# Patient Record
Sex: FEMALE | Race: WHITE | NOT HISPANIC OR LATINO | URBAN - METROPOLITAN AREA
[De-identification: names, ages, dates, MRNs, and addresses within clinical notes are randomized per-mention and may not be internally consistent; named-entity substitution may affect disease eponyms.]

---

## 2017-10-24 ENCOUNTER — EMERGENCY (EMERGENCY)
Facility: HOSPITAL | Age: 57
LOS: 1 days | Discharge: HOME | End: 2017-10-24
Attending: EMERGENCY MEDICINE | Admitting: EMERGENCY MEDICINE
Payer: COMMERCIAL

## 2017-10-24 VITALS
OXYGEN SATURATION: 99 % | WEIGHT: 130.95 LBS | RESPIRATION RATE: 20 BRPM | HEART RATE: 90 BPM | SYSTOLIC BLOOD PRESSURE: 156 MMHG | DIASTOLIC BLOOD PRESSURE: 91 MMHG | TEMPERATURE: 98 F

## 2017-10-24 PROCEDURE — 99283 EMERGENCY DEPT VISIT LOW MDM: CPT

## 2017-10-24 RX ORDER — CYCLOBENZAPRINE HYDROCHLORIDE 10 MG/1
1 TABLET, FILM COATED ORAL
Qty: 15 | Refills: 0 | OUTPATIENT
Start: 2017-10-24 | End: 2017-10-29

## 2017-10-24 RX ORDER — OXYCODONE AND ACETAMINOPHEN 5; 325 MG/1; MG/1
1 TABLET ORAL ONCE
Qty: 0 | Refills: 0 | Status: DISCONTINUED | OUTPATIENT
Start: 2017-10-24 | End: 2017-10-24

## 2017-10-24 RX ORDER — OXYCODONE HYDROCHLORIDE 5 MG/1
1 TABLET ORAL
Qty: 5 | Refills: 0 | OUTPATIENT
Start: 2017-10-24 | End: 2017-10-27

## 2017-10-24 RX ORDER — CYCLOBENZAPRINE HYDROCHLORIDE 10 MG/1
10 TABLET, FILM COATED ORAL ONCE
Qty: 0 | Refills: 0 | Status: COMPLETED | OUTPATIENT
Start: 2017-10-24 | End: 2017-10-24

## 2017-10-24 RX ADMIN — OXYCODONE AND ACETAMINOPHEN 1 TABLET(S): 5; 325 TABLET ORAL at 09:51

## 2017-10-24 RX ADMIN — CYCLOBENZAPRINE HYDROCHLORIDE 10 MILLIGRAM(S): 10 TABLET, FILM COATED ORAL at 09:51

## 2017-10-24 NOTE — ED PROVIDER NOTE - PLAN OF CARE
take the medications as prescribed  the muscle relaxant and percocet makes you sleepy so don't drive or operate machinery while taking it  followup with your primary care doctor  return if worse

## 2017-10-24 NOTE — ED PROVIDER NOTE - CARE PLAN
Principal Discharge DX:	Chronic right-sided low back pain with right-sided sciatica  Instructions for follow-up, activity and diet:	take the medications as prescribed  the muscle relaxant and percocet makes you sleepy so don't drive or operate machinery while taking it  followup with your primary care doctor  return if worse

## 2017-10-24 NOTE — ED PROVIDER NOTE - PROGRESS NOTE DETAILS
pt seen in ED Jan 2016 for similar sx, also in  istop report, pt with frequent rx for narcotics, last ones in mid-sept 2017, and August 2017 for a few days.

## 2017-10-24 NOTE — ED ADULT NURSE NOTE - CHPI ED SYMPTOMS NEG
no neck tenderness/no difficulty bearing weight/no fatigue/no bowel dysfunction/no bladder dysfunction/no motor function loss/no tingling/no constipation/no numbness

## 2017-10-24 NOTE — ED PROVIDER NOTE - MEDICAL DECISION MAKING DETAILS
57yoF with acute on chronic low back pain, unknown trigger of exacerbation, sx improved with percocet and flexeril in ED, d/c home with rx for same.

## 2017-10-24 NOTE — ED PROVIDER NOTE - OBJECTIVE STATEMENT
57yoF here with low back pain, R sided, radiates down to R leg, no associated numbness/weakness, no urinary or bowel incontinence, pt states "it hasn't bothered me for 20  years". Sx started 3 days ago, tried Tylenol with minimal relief of pain, reports was unable to get in to see her primary care doctor and has a flight today. Reports anaphylactic reaction to ibuprofen.

## 2017-10-28 ENCOUNTER — EMERGENCY (EMERGENCY)
Facility: HOSPITAL | Age: 57
LOS: 1 days | Discharge: HOME | End: 2017-10-28
Attending: EMERGENCY MEDICINE | Admitting: EMERGENCY MEDICINE
Payer: COMMERCIAL

## 2017-10-28 VITALS
OXYGEN SATURATION: 95 % | SYSTOLIC BLOOD PRESSURE: 135 MMHG | RESPIRATION RATE: 18 BRPM | HEIGHT: 64 IN | HEART RATE: 97 BPM | DIASTOLIC BLOOD PRESSURE: 79 MMHG | WEIGHT: 130.95 LBS | TEMPERATURE: 98 F

## 2017-10-28 DIAGNOSIS — Z88.0 ALLERGY STATUS TO PENICILLIN: ICD-10-CM

## 2017-10-28 DIAGNOSIS — Z88.8 ALLERGY STATUS TO OTHER DRUGS, MEDICAMENTS AND BIOLOGICAL SUBSTANCES: ICD-10-CM

## 2017-10-28 DIAGNOSIS — M54.41 LUMBAGO WITH SCIATICA, RIGHT SIDE: ICD-10-CM

## 2017-10-28 DIAGNOSIS — Z79.1 LONG TERM (CURRENT) USE OF NON-STEROIDAL ANTI-INFLAMMATORIES (NSAID): ICD-10-CM

## 2017-10-28 DIAGNOSIS — Z88.8 ALLERGY STATUS TO OTHER DRUGS, MEDICAMENTS AND BIOLOGICAL SUBSTANCES STATUS: ICD-10-CM

## 2017-10-28 DIAGNOSIS — G89.29 OTHER CHRONIC PAIN: ICD-10-CM

## 2017-10-28 DIAGNOSIS — Z79.891 LONG TERM (CURRENT) USE OF OPIATE ANALGESIC: ICD-10-CM

## 2017-10-28 DIAGNOSIS — M54.5 LOW BACK PAIN: ICD-10-CM

## 2017-10-28 DIAGNOSIS — Z88.6 ALLERGY STATUS TO ANALGESIC AGENT: ICD-10-CM

## 2017-10-28 DIAGNOSIS — Z79.899 OTHER LONG TERM (CURRENT) DRUG THERAPY: ICD-10-CM

## 2017-10-28 PROCEDURE — 99283 EMERGENCY DEPT VISIT LOW MDM: CPT

## 2017-10-28 RX ORDER — OXYCODONE AND ACETAMINOPHEN 5; 325 MG/1; MG/1
1 TABLET ORAL ONCE
Qty: 0 | Refills: 0 | Status: DISCONTINUED | OUTPATIENT
Start: 2017-10-28 | End: 2017-10-28

## 2017-10-28 RX ADMIN — OXYCODONE AND ACETAMINOPHEN 1 TABLET(S): 5; 325 TABLET ORAL at 16:56
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
## 2017-10-28 NOTE — ED ADULT TRIAGE NOTE - CHIEF COMPLAINT QUOTE
Patient c/o worsening lower back pain for 6days . Was here last sunday for the same reason  . history of sciatica .

## 2017-10-28 NOTE — ED PROVIDER NOTE - MUSCULOSKELETAL, MLM
Spine appears normal, range of motion is not limited, no muscle or joint tenderness, soft tissue ttp R lower lumbar paraspinal region and buttock.

## 2017-10-28 NOTE — ED ADULT NURSE NOTE - OBJECTIVE STATEMENT
c/o right started last Saturday Tuesday was in ER and given Percocet and Muscle relaxer. Back because of back pain still there , unable to go to [place] trip.

## 2019-01-26 ENCOUNTER — EMERGENCY (EMERGENCY)
Facility: HOSPITAL | Age: 59
LOS: 1 days | Discharge: HOME | End: 2019-01-26
Attending: EMERGENCY MEDICINE | Admitting: EMERGENCY MEDICINE
Payer: SELF-PAY

## 2019-01-26 VITALS
SYSTOLIC BLOOD PRESSURE: 125 MMHG | HEART RATE: 92 BPM | OXYGEN SATURATION: 97 % | RESPIRATION RATE: 18 BRPM | DIASTOLIC BLOOD PRESSURE: 82 MMHG | HEIGHT: 64 IN | TEMPERATURE: 98 F | WEIGHT: 138.45 LBS

## 2019-01-26 PROBLEM — F41.9 ANXIETY DISORDER, UNSPECIFIED: Chronic | Status: ACTIVE | Noted: 2017-10-28

## 2019-01-26 PROCEDURE — 99283 EMERGENCY DEPT VISIT LOW MDM: CPT

## 2019-01-26 RX ORDER — OXYCODONE AND ACETAMINOPHEN 5; 325 MG/1; MG/1
1 TABLET ORAL ONCE
Qty: 0 | Refills: 0 | Status: DISCONTINUED | OUTPATIENT
Start: 2019-01-26 | End: 2019-01-26

## 2019-01-26 RX ADMIN — OXYCODONE AND ACETAMINOPHEN 1 TABLET(S): 5; 325 TABLET ORAL at 09:00

## 2019-01-26 NOTE — ED ADULT TRIAGE NOTE - CHIEF COMPLAINT QUOTE
Pt with known Hx of Back Pain CO lower back pain radiating down RLE x1 month.  Pt states "This was a problem a few years ago but I've been out of work and it went away, I just started working again and the pain came back."  PT denies recent falls, trauma, dizziness, N/V/D, SOB, Fevers and CP

## 2019-01-26 NOTE — ED PROVIDER NOTE - NSFOLLOWUPINSTRUCTIONS_ED_ALL_ED_FT
Follow up with your PMD in 2 days.     Take percocet as prescribed.    Return to ED with worsening pain, lower extremity weakness, numbness, urinary retention, bowel incontinence.    Sciatica  Sciatica is pain, numbness, weakness, or tingling along the path of the sciatic nerve. The sciatic nerve starts in the lower back and runs down the back of each leg. The nerve controls the muscles in the lower leg and in the back of the knee. It also provides feeling (sensation) to the back of the thigh, the lower leg, and the sole of the foot. Sciatica is a symptom of another medical condition that pinches or puts pressure on the sciatic nerve.    Generally, sciatica only affects one side of the body. Sciatica usually goes away on its own or with treatment. In some cases, sciatica may keep coming back (recur).    What are the causes?  This condition is caused by pressure on the sciatic nerve, or pinching of the sciatic nerve. This may be the result of:    A disk in between the bones of the spine (vertebrae) bulging out too far (herniated disk).  Age-related changes in the spinal disks (degenerative disk disease).  A pain disorder that affects a muscle in the buttock (piriformis syndrome).  Extra bone growth (bone spur) near the sciatic nerve.  An injury or break (fracture) of the pelvis.  Pregnancy.  Tumor (rare).    What increases the risk?  The following factors may make you more likely to develop this condition:    Playing sports that place pressure or stress on the spine, such as football or weight lifting.  Having poor strength and flexibility.  A history of back injury.  A history of back surgery.  Sitting for long periods of time.  Doing activities that involve repetitive bending or lifting.  Obesity.    What are the signs or symptoms?  Symptoms can vary from mild to very severe, and they may include:    Any of these problems in the lower back, leg, hip, or buttock:    Mild tingling or dull aches.  Burning sensations.  Sharp pains.    Numbness in the back of the calf or the sole of the foot.  Leg weakness.  Severe back pain that makes movement difficult.    These symptoms may get worse when you cough, sneeze, or laugh, or when you sit or stand for long periods of time. Being overweight may also make symptoms worse. In some cases, symptoms may recur over time.    How is this diagnosed?  This condition may be diagnosed based on:    Your symptoms.  A physical exam. Your health care provider may ask you to do certain movements to check whether those movements trigger your symptoms.  You may have tests, including:    Blood tests.  X-rays.  MRI.  CT scan.      How is this treated?  In many cases, this condition improves on its own, without any treatment. However, treatment may include:    Reducing or modifying physical activity during periods of pain.  Exercising and stretching to strengthen your abdomen and improve the flexibility of your spine.  Icing and applying heat to the affected area.  Medicines that help:    To relieve pain and swelling.  To relax your muscles.    Injections of medicines that help to relieve pain, irritation, and inflammation around the sciatic nerve (steroids).  Surgery.    Follow these instructions at home:  Medicines     Take over-the-counter and prescription medicines only as told by your health care provider.  Do not drive or operate heavy machinery while taking prescription pain medicine.  Managing pain     If directed, apply ice to the affected area.    Put ice in a plastic bag.  Place a towel between your skin and the bag.  Leave the ice on for 20 minutes, 2–3 times a day.    After icing, apply heat to the affected area before you exercise or as often as told by your health care provider. Use the heat source that your health care provider recommends, such as a moist heat pack or a heating pad.    Place a towel between your skin and the heat source.  Leave the heat on for 20–30 minutes.  Remove the heat if your skin turns bright red. This is especially important if you are unable to feel pain, heat, or cold. You may have a greater risk of getting burned.    Activity     Return to your normal activities as told by your health care provider. Ask your health care provider what activities are safe for you.    Avoid activities that make your symptoms worse.    Take brief periods of rest throughout the day. Resting in a lying or standing position is usually better than sitting to rest.    When you rest for longer periods, mix in some mild activity or stretching between periods of rest. This will help to prevent stiffness and pain.  Avoid sitting for long periods of time without moving. Get up and move around at least one time each hour.    Exercise and stretch regularly, as told by your health care provider.  Do not lift anything that is heavier than 10 lb (4.5 kg) while you have symptoms of sciatica. When you do not have symptoms, you should still avoid heavy lifting, especially repetitive heavy lifting.  When you lift objects, always use proper lifting technique, which includes:    Bending your knees.  Keeping the load close to your body.  Avoiding twisting.    General instructions     Use good posture.    Avoid leaning forward while sitting.  Avoid hunching over while standing.    Maintain a healthy weight. Excess weight puts extra stress on your back and makes it difficult to maintain good posture.  Wear supportive, comfortable shoes. Avoid wearing high heels.  Avoid sleeping on a mattress that is too soft or too hard. A mattress that is firm enough to support your back when you sleep may help to reduce your pain.  Keep all follow-up visits as told by your health care provider. This is important.  Contact a health care provider if:  You have pain that wakes you up when you are sleeping.  You have pain that gets worse when you lie down.  Your pain is worse than you have experienced in the past.  Your pain lasts longer than 4 weeks.  You experience unexplained weight loss.  Get help right away if:  You lose control of your bowel or bladder (incontinence).  You have:    Weakness in your lower back, pelvis, buttocks, or legs that gets worse.  Redness or swelling of your back.  A burning sensation when you urinate.    This information is not intended to replace advice given to you by your health care provider. Make sure you discuss any questions you have with your health care provider.

## 2019-01-26 NOTE — ED ADULT NURSE NOTE - NSIMPLEMENTINTERV_GEN_ALL_ED
Implemented All Universal Safety Interventions:  [place] to call system. Call bell, personal items and telephone within reach. Instruct patient to call for assistance. Room bathroom lighting operational. Non-slip footwear when patient is off stretcher. Physically safe environment: no spills, clutter or unnecessary equipment. Stretcher in lowest position, wheels locked, appropriate side rails in place.

## 2019-01-26 NOTE — ED PROVIDER NOTE - OBJECTIVE STATEMENT
59 y/o f with PMH of PTSD, herniated disc, sciatica presents to ED with right paralumbar back pain radiating down right leg progressively getting worse x several weeks.  Pt states she has been walking more at job and that might have exacerbated her symptoms.  Has had sciatica in the past and this feels similar.  Pt has medical insurance being reinstated in two weeks and therefore was unable to see PMD.  Pt has been taking tylenol at home with some relief.  Denies lower ext weakness, numbness, urinary retention, bowel incontinence.  No dysuria, frequency.  No fever or chills.  Pt has been ambulating without requiring assistance.  Has not noted any change of temperature to lower ext.  No calf tenderness or swelling.

## 2019-01-26 NOTE — ED PROVIDER NOTE - MUSCULOSKELETAL, MLM
mild right paralumbar tenderness, no midline tenderness, no skin color changes or rash, mild pain with extension of right leg, no calf tenderness or swelling, distal pulses intact +dp, +pt, warm leg normal temp, left lower ext wnl

## 2019-01-26 NOTE — ED PROVIDER NOTE - MEDICAL DECISION MAKING DETAILS
Pt with right paralumbar tenderness with pain down right leg c/w pt's prior sciatica.  No lower ext weakness or numbness or concerning signs for cord compression, no vascular signs of numbness, change in temperature to suggest arterial or venous thrombosis.

## 2019-01-26 NOTE — ED ADULT NURSE NOTE - OBJECTIVE STATEMENT
59 y/o female w/ hx of sciatica presents to ED c/o sharp, stabbing right lower back radiating down right leg x 1 month associated w/ right leg numbness. Denies  weakness, tingling, bowel or bladder incontinence, and any other associated sx.

## 2019-01-30 DIAGNOSIS — M54.41 LUMBAGO WITH SCIATICA, RIGHT SIDE: ICD-10-CM

## 2019-01-30 DIAGNOSIS — Z88.8 ALLERGY STATUS TO OTHER DRUGS, MEDICAMENTS AND BIOLOGICAL SUBSTANCES: ICD-10-CM

## 2019-01-30 DIAGNOSIS — M54.5 LOW BACK PAIN: ICD-10-CM

## 2019-01-30 DIAGNOSIS — Z79.899 OTHER LONG TERM (CURRENT) DRUG THERAPY: ICD-10-CM

## 2019-01-30 DIAGNOSIS — Z88.0 ALLERGY STATUS TO PENICILLIN: ICD-10-CM

## 2019-02-01 ENCOUNTER — EMERGENCY (EMERGENCY)
Facility: HOSPITAL | Age: 59
LOS: 1 days | Discharge: HOME | End: 2019-02-01
Admitting: EMERGENCY MEDICINE
Payer: SELF-PAY

## 2019-02-01 VITALS
HEIGHT: 64 IN | RESPIRATION RATE: 18 BRPM | DIASTOLIC BLOOD PRESSURE: 79 MMHG | SYSTOLIC BLOOD PRESSURE: 164 MMHG | HEART RATE: 76 BPM | OXYGEN SATURATION: 100 % | TEMPERATURE: 98 F | WEIGHT: 137.13 LBS

## 2019-02-01 PROBLEM — M54.30 SCIATICA, UNSPECIFIED SIDE: Chronic | Status: ACTIVE | Noted: 2019-01-26

## 2019-02-01 PROCEDURE — 72100 X-RAY EXAM L-S SPINE 2/3 VWS: CPT

## 2019-02-01 PROCEDURE — 72100 X-RAY EXAM L-S SPINE 2/3 VWS: CPT | Mod: 26

## 2019-02-01 PROCEDURE — 99283 EMERGENCY DEPT VISIT LOW MDM: CPT

## 2019-02-01 RX ORDER — OXYCODONE AND ACETAMINOPHEN 5; 325 MG/1; MG/1
1 TABLET ORAL ONCE
Qty: 0 | Refills: 0 | Status: DISCONTINUED | OUTPATIENT
Start: 2019-02-01 | End: 2019-02-01

## 2019-02-01 RX ORDER — METHOCARBAMOL 500 MG/1
1 TABLET, FILM COATED ORAL
Qty: 15 | Refills: 0
Start: 2019-02-01 | End: 2019-02-05

## 2019-02-01 RX ADMIN — OXYCODONE AND ACETAMINOPHEN 1 TABLET(S): 5; 325 TABLET ORAL at 14:08

## 2019-02-01 NOTE — ED ADULT NURSE NOTE - OBJECTIVE STATEMENT
Pt presents to ED c/o back pain and R leg pain "for months," currently 6/10, similar to pt's previous sciatica exacerbations which she was seen for in [facility] ED last week. Pt denies new injury, numbness/tingling, bladder/bowel incontinence. Pt presents in NAD speaking full sentences ambulatory through triage.

## 2019-02-01 NOTE — ED PROVIDER NOTE - PHYSICAL EXAMINATION
back pain + reproducible at right lower sciatica area no midline pain, no motor or sensory deficit, Able to flex and extend at knee at ankle. ambulating well.

## 2019-02-01 NOTE — ED ADULT NURSE NOTE - NSIMPLEMENTINTERV_GEN_ALL_ED
Implemented All Universal Safety Interventions:  [place] to call system. Call bell, personal items and telephone within reach. Instruct patient to call for assistance. Room bathroom lighting operational. Non-slip footwear when patient is off stretcher. Physically safe environment: no spills, clutter or unnecessary equipment. Stretcher in lowest position, wheels locked, appropriate side rails in place.

## 2019-02-01 NOTE — ED PROVIDER NOTE - MEDICAL DECISION MAKING DETAILS
Patient with lower back pain atraumatic with radiating pain,. Recommend keep scheduled appt and f/u with spine.

## 2019-02-01 NOTE — ED PROVIDER NOTE - OBJECTIVE STATEMENT
57y/o disc disorder, sciatica present to ED c/o right sided back pain radiating to right leg. Patient state pain worsen with movement 59y/o disc disorder, sciatica present to ED c/o right sided back pain radiating to right leg. Patient state pain worsen with movement. Denies fever, n, v, abd pain, dysuria, hematuria, paresis, incontinence. Patient was seen here a week ago for same condition and was given pain meds and she had appointments scheduled for spine consultation. However pt does not get her new job ins until 2/9.

## 2019-02-01 NOTE — ED ADULT TRIAGE NOTE - CHIEF COMPLAINT QUOTE
patient c/o lower back pain radiating to rt leg with weakness and numbness for 2 months . Was seen in the Er last week for the same reason .

## 2019-02-05 DIAGNOSIS — M54.9 DORSALGIA, UNSPECIFIED: ICD-10-CM

## 2019-02-05 DIAGNOSIS — Z88.0 ALLERGY STATUS TO PENICILLIN: ICD-10-CM

## 2019-02-05 DIAGNOSIS — Z88.1 ALLERGY STATUS TO OTHER ANTIBIOTIC AGENTS STATUS: ICD-10-CM

## 2019-02-05 DIAGNOSIS — Z79.899 OTHER LONG TERM (CURRENT) DRUG THERAPY: ICD-10-CM

## 2019-02-05 DIAGNOSIS — Z88.6 ALLERGY STATUS TO ANALGESIC AGENT: ICD-10-CM

## 2019-02-05 DIAGNOSIS — Z79.891 LONG TERM (CURRENT) USE OF OPIATE ANALGESIC: ICD-10-CM

## 2019-02-05 DIAGNOSIS — M54.41 LUMBAGO WITH SCIATICA, RIGHT SIDE: ICD-10-CM

## 2019-02-05 DIAGNOSIS — M51.86 OTHER INTERVERTEBRAL DISC DISORDERS, LUMBAR REGION: ICD-10-CM

## 2019-04-19 ENCOUNTER — EMERGENCY (EMERGENCY)
Facility: HOSPITAL | Age: 59
LOS: 1 days | Discharge: HOME | End: 2019-04-19
Admitting: EMERGENCY MEDICINE
Payer: SELF-PAY

## 2019-04-19 VITALS
SYSTOLIC BLOOD PRESSURE: 137 MMHG | WEIGHT: 132.94 LBS | HEART RATE: 83 BPM | OXYGEN SATURATION: 98 % | DIASTOLIC BLOOD PRESSURE: 85 MMHG | TEMPERATURE: 99 F | RESPIRATION RATE: 17 BRPM

## 2019-04-19 PROCEDURE — 99283 EMERGENCY DEPT VISIT LOW MDM: CPT

## 2019-04-19 RX ORDER — CYCLOBENZAPRINE HYDROCHLORIDE 10 MG/1
1 TABLET, FILM COATED ORAL
Qty: 14 | Refills: 0 | OUTPATIENT
Start: 2019-04-19

## 2019-04-19 RX ORDER — CYCLOBENZAPRINE HYDROCHLORIDE 10 MG/1
1 TABLET, FILM COATED ORAL
Qty: 14 | Refills: 0
Start: 2019-04-19

## 2019-04-19 RX ORDER — OXYCODONE AND ACETAMINOPHEN 5; 325 MG/1; MG/1
1 TABLET ORAL ONCE
Qty: 0 | Refills: 0 | Status: DISCONTINUED | OUTPATIENT
Start: 2019-04-19 | End: 2019-04-19

## 2019-04-19 RX ORDER — CYCLOBENZAPRINE HYDROCHLORIDE 10 MG/1
10 TABLET, FILM COATED ORAL ONCE
Qty: 0 | Refills: 0 | Status: COMPLETED | OUTPATIENT
Start: 2019-04-19 | End: 2019-04-19

## 2019-04-19 RX ADMIN — CYCLOBENZAPRINE HYDROCHLORIDE 10 MILLIGRAM(S): 10 TABLET, FILM COATED ORAL at 16:03

## 2019-04-19 RX ADMIN — OXYCODONE AND ACETAMINOPHEN 1 TABLET(S): 5; 325 TABLET ORAL at 16:03

## 2019-04-19 NOTE — ED ADULT NURSE NOTE - NSIMPLEMENTINTERV_GEN_ALL_ED
Implemented All Universal Safety Interventions:  [place] to call system. Call bell, personal items and telephone within reach. Instruct patient to call for assistance. Room bathroom lighting operational. Non-slip footwear when patient is off stretcher. Physically safe environment: no spills, clutter or unnecessary equipment. Stretcher in lowest position, wheels locked, appropriate side rails in place.

## 2019-04-19 NOTE — ED PROVIDER NOTE - CLINICAL SUMMARY MEDICAL DECISION MAKING FREE TEXT BOX
57 y/o f presents c/o low back pain radiating to right leg; no neuro deficits, pt ambulatory, allergic to NSAIDs, given pain meds and feeling better, will d/c with short course of pain meds, to f/u with ortho (reports insurance will be active next week).

## 2019-04-19 NOTE — ED PROVIDER NOTE - OBJECTIVE STATEMENT
59 y/o f presents c/o lower back pain which radiates to her right leg.  Pt stating this pain has been coming and going for the past few months, doesn't have insurance to follow up for an MRI.  Pt hasn't taken anything for pain at home.  Pt denies numbness/tingling to ext, weakness, saddle anesthesia, bowel/bladder incontinence, all other ROS negative.

## 2019-04-19 NOTE — ED PROVIDER NOTE - NSFOLLOWUPINSTRUCTIONS_ED_ALL_ED_FT
Back Pain    WHAT YOU NEED TO KNOW:    Back pain is common. It can be caused by many conditions, such as arthritis or the breakdown of spinal discs. Your risk for back pain is increased by injuries, lack of activity, or repeated bending and twisting. You may feel sore or stiff on one or both sides of your back. The pain may spread to your buttocks or thighs.    DISCHARGE INSTRUCTIONS:    Return to the emergency department if:     You have pain, numbness, or weakness in one or both legs.      Your pain becomes so severe that you cannot walk.      You cannot control your urine or bowel movements.      You have severe back pain with chest pain.      You have severe back pain, nausea, and vomiting.      You have severe back pain that spreads to your side or genital area.    Contact your healthcare provider if:     You have back pain that does not get better with rest and pain medicine.      You have a fever.      You have pain that worsens when you are on your back or when you rest.      You have pain that worsens when you cough or sneeze.      You lose weight without trying.      You have questions or concerns about your condition or care.    Medicines:     NSAIDs help decrease swelling and pain. This medicine is available with or without a doctor's order. NSAIDs can cause stomach bleeding or kidney problems in certain people. If you take blood thinner medicine, always ask your healthcare provider if NSAIDs are safe for you. Always read the medicine label and follow directions.      Acetaminophen decreases pain and fever. It is available without a doctor's order. Ask how much to take and how often to take it. Follow directions. Read the labels of all other medicines you are using to see if they also contain acetaminophen, or ask your doctor or pharmacist. Acetaminophen can cause liver damage if not taken correctly. Do not use more than 4 grams (4,000 milligrams) total of acetaminophen in one day.       Muscle relaxers help decrease muscle spasms and back pain.      Prescription pain medicine may be given. Ask your healthcare provider how to take this medicine safely. Some prescription pain medicines contain acetaminophen. Do not take other medicines that contain acetaminophen without talking to your healthcare provider. Too much acetaminophen may cause liver damage. Prescription pain medicine may cause constipation. Ask your healthcare provider how to prevent or treat constipation.       Take your medicine as directed. Contact your healthcare provider if you think your medicine is not helping or if you have side effects. Tell him or her if you are allergic to any medicine. Keep a list of the medicines, vitamins, and herbs you take. Include the amounts, and when and why you take them. Bring the list or the pill bottles to follow-up visits. Carry your medicine list with you in case of an emergency.    How to manage your back pain:     Apply ice on your back for 15 to 20 minutes every hour or as directed. Use an ice pack, or put crushed ice in a plastic bag. Cover it with a towel before you apply it to your skin. Ice helps prevent tissue damage and decreases pain.      Apply heat on your back for 20 to 30 minutes every 2 hours for as many days as directed. Heat helps decrease pain and muscle spasms.      Stay active as much as you can without causing more pain. Bed rest could make your back pain worse. Avoid heavy lifting until your pain is gone.      Go to physical therapy as directed. A physical therapist can teach you exercises to help improve movement and strength, and to decrease pain.    Follow up with your healthcare provider in 2 weeks, or as directed: Write down your questions so you remember to ask them during your visits.

## 2019-04-19 NOTE — ED ADULT NURSE NOTE - CHPI ED NUR SYMPTOMS NEG
no bladder dysfunction/no motor function loss/no constipation/no fatigue/no bowel dysfunction/no numbness/no neck tenderness/no tingling/no anorexia

## 2019-04-23 DIAGNOSIS — Z88.6 ALLERGY STATUS TO ANALGESIC AGENT: ICD-10-CM

## 2019-04-23 DIAGNOSIS — Z79.899 OTHER LONG TERM (CURRENT) DRUG THERAPY: ICD-10-CM

## 2019-04-23 DIAGNOSIS — M54.9 DORSALGIA, UNSPECIFIED: ICD-10-CM

## 2019-04-23 DIAGNOSIS — Z79.891 LONG TERM (CURRENT) USE OF OPIATE ANALGESIC: ICD-10-CM

## 2019-04-23 DIAGNOSIS — M54.5 LOW BACK PAIN: ICD-10-CM

## 2019-04-23 DIAGNOSIS — Z88.0 ALLERGY STATUS TO PENICILLIN: ICD-10-CM

## 2019-04-23 DIAGNOSIS — Z88.8 ALLERGY STATUS TO OTHER DRUGS, MEDICAMENTS AND BIOLOGICAL SUBSTANCES: ICD-10-CM

## 2019-05-27 ENCOUNTER — EMERGENCY (EMERGENCY)
Facility: HOSPITAL | Age: 59
LOS: 1 days | Discharge: HOME | End: 2019-05-27
Admitting: EMERGENCY MEDICINE
Payer: SELF-PAY

## 2019-05-27 VITALS
SYSTOLIC BLOOD PRESSURE: 122 MMHG | DIASTOLIC BLOOD PRESSURE: 77 MMHG | HEART RATE: 77 BPM | RESPIRATION RATE: 16 BRPM | WEIGHT: 127.65 LBS | OXYGEN SATURATION: 97 % | TEMPERATURE: 98 F

## 2019-05-27 LAB
APPEARANCE UR: CLEAR — SIGNIFICANT CHANGE UP
BACTERIA # UR AUTO: PRESENT /HPF
BILIRUB UR-MCNC: NEGATIVE — SIGNIFICANT CHANGE UP
COLOR SPEC: YELLOW — SIGNIFICANT CHANGE UP
DIFF PNL FLD: NEGATIVE — SIGNIFICANT CHANGE UP
EPI CELLS # UR: SIGNIFICANT CHANGE UP /HPF (ref 0–5)
GLUCOSE UR QL: NEGATIVE — SIGNIFICANT CHANGE UP
KETONES UR-MCNC: ABNORMAL MG/DL
LEUKOCYTE ESTERASE UR-ACNC: ABNORMAL
NITRITE UR-MCNC: NEGATIVE — SIGNIFICANT CHANGE UP
PH UR: 6 — SIGNIFICANT CHANGE UP (ref 5–8)
PROT UR-MCNC: 30 MG/DL
RBC CASTS # UR COMP ASSIST: < 5 /HPF — SIGNIFICANT CHANGE UP
SP GR SPEC: >=1.03 — SIGNIFICANT CHANGE UP (ref 1–1.03)
UROBILINOGEN FLD QL: 0.2 E.U./DL — SIGNIFICANT CHANGE UP
WBC UR QL: ABNORMAL /HPF

## 2019-05-27 PROCEDURE — 99284 EMERGENCY DEPT VISIT MOD MDM: CPT

## 2019-05-27 PROCEDURE — 81001 URINALYSIS AUTO W/SCOPE: CPT

## 2019-05-27 PROCEDURE — 87086 URINE CULTURE/COLONY COUNT: CPT

## 2019-05-27 PROCEDURE — 72100 X-RAY EXAM L-S SPINE 2/3 VWS: CPT

## 2019-05-27 PROCEDURE — 99283 EMERGENCY DEPT VISIT LOW MDM: CPT

## 2019-05-27 PROCEDURE — 72100 X-RAY EXAM L-S SPINE 2/3 VWS: CPT | Mod: 26

## 2019-05-27 RX ORDER — NITROFURANTOIN MACROCRYSTAL 50 MG
100 CAPSULE ORAL ONCE
Refills: 0 | Status: COMPLETED | OUTPATIENT
Start: 2019-05-27 | End: 2019-05-27

## 2019-05-27 RX ORDER — PHENAZOPYRIDINE HCL 100 MG
1 TABLET ORAL
Qty: 6 | Refills: 0
Start: 2019-05-27 | End: 2019-05-28

## 2019-05-27 RX ORDER — NITROFURANTOIN MACROCRYSTAL 50 MG
1 CAPSULE ORAL
Qty: 14 | Refills: 0
Start: 2019-05-27 | End: 2019-06-02

## 2019-05-27 RX ORDER — PHENAZOPYRIDINE HCL 100 MG
200 TABLET ORAL ONCE
Refills: 0 | Status: COMPLETED | OUTPATIENT
Start: 2019-05-27 | End: 2019-05-27

## 2019-05-27 RX ORDER — OXYCODONE AND ACETAMINOPHEN 5; 325 MG/1; MG/1
1 TABLET ORAL ONCE
Refills: 0 | Status: DISCONTINUED | OUTPATIENT
Start: 2019-05-27 | End: 2019-05-27

## 2019-05-27 RX ORDER — CYCLOBENZAPRINE HYDROCHLORIDE 10 MG/1
10 TABLET, FILM COATED ORAL ONCE
Refills: 0 | Status: COMPLETED | OUTPATIENT
Start: 2019-05-27 | End: 2019-05-27

## 2019-05-27 RX ORDER — TRAMADOL HYDROCHLORIDE 50 MG/1
1 TABLET ORAL
Qty: 10 | Refills: 0
Start: 2019-05-27 | End: 2019-05-31

## 2019-05-27 RX ORDER — LIDOCAINE 4 G/100G
1 CREAM TOPICAL
Qty: 5 | Refills: 0
Start: 2019-05-27 | End: 2019-05-31

## 2019-05-27 RX ORDER — CYCLOBENZAPRINE HYDROCHLORIDE 10 MG/1
1 TABLET, FILM COATED ORAL
Qty: 21 | Refills: 0
Start: 2019-05-27 | End: 2019-06-02

## 2019-05-27 RX ADMIN — Medication 100 MILLIGRAM(S): at 12:17

## 2019-05-27 RX ADMIN — CYCLOBENZAPRINE HYDROCHLORIDE 10 MILLIGRAM(S): 10 TABLET, FILM COATED ORAL at 11:43

## 2019-05-27 RX ADMIN — Medication 200 MILLIGRAM(S): at 12:17

## 2019-05-27 RX ADMIN — OXYCODONE AND ACETAMINOPHEN 1 TABLET(S): 5; 325 TABLET ORAL at 11:43

## 2019-05-27 NOTE — ED ADULT NURSE NOTE - OBJECTIVE STATEMENT
Patient is a 59yo F. arrived to ED via walk-in, AAOx3, in NAD, vitals stable, complaining of lower back pain with radiation and numbness down RLE.  Patient also complaining of difficulty urinating, states inability to void completely.  Patient denies any injuries, falls, pain with urination, loss of bladder or bowel function or any other complaints at this time.

## 2019-05-27 NOTE — ED PROVIDER NOTE - CLINICAL SUMMARY MEDICAL DECISION MAKING FREE TEXT BOX
57 y/o female with chronic lower back pain with known right LE sciatica. Pt reports she is allergic to motrin (states throat swells). Pt ambulatory in ED and well-appearing. No CVAT. UA + leuks, no blood. Do not suspect stone. No chest pain, sob. Do not suspect dissection. No fever, chills. Neuro intact. Do not suspect cauda equina. No vaginal dc and no concern for std. Will treat for chronic back pain and possible UTI given + leuks/wbc and symptoms. Pt advised to follow up with PMD. Given strict return precautions.

## 2019-05-27 NOTE — ED PROVIDER NOTE - NS ED ROS FT
CONSTITUTIONAL: No fever/chills.  NEURO: no headache; no tingling/numbness/weakness in extremities; no saddle anesthesia.  CV: no chest pain or palpitations  PULM: no dyspnea, cough, or hemoptysis  GI: no abdominal pain; no N/V; no BRBPR or melena; no diarrhea or fecal incontinence  : no dysuria/hematura/frequency; no urinary incontinence  MSK: no neck pain; no joint pain; no swelling of extremities  DERM: no rash or lesions

## 2019-05-27 NOTE — ED PROVIDER NOTE - OBJECTIVE STATEMENT
59 y/o female with a chronic hx of lower back pain (sciatica and herniated/bulging lumbar disc disorder) with right sided radiculopathy is present in the ED with reoccurring atraumatic pain to her lower back. Pt reports the pain has been intermittent for the past 30 years. This past Thursday she noticed a gradual onset of back pain that is present in the same location. Pt describes an aching type of discomfort. She self-medicated with Tylenol without improvement of the pain. Last night pt reports she experienced the inability to completely void. She went to the restroom and described only a "little bit of flow". She was able to urinate today, however again reports feeling as if she was unable to completely void. She denies the following: urinary frequency, urgency, dysuria, discoloration, odor. Pt reports having slight decrease sensation to her right leg that radiates to her right toe that she has experienced several times in the past. She denies the following: recent/past fall, injury, chest pain, sob, hx of kidney stones, fever, chills, abdominal pain, NV, diarrhea, constipation, numbness/tingling of extremities, loss of urine/bm, saddle anesthesia, rash.

## 2019-05-27 NOTE — ED ADULT TRIAGE NOTE - CHIEF COMPLAINT QUOTE
has had right back/hip pain into right leg for weeks and saw a neurologist; and  was told if she has urinary problems to come to ED- started having difficulty starting urinating and keeping flow going- starts and stops; I have had a urinary infection and it doesn't feel like that "

## 2019-05-27 NOTE — ED PROVIDER NOTE - NSFOLLOWUPINSTRUCTIONS_ED_ALL_ED_FT
Please take medication as indicated and follow up with upcoming appointment with your Orthopedic and please follow up with your PMD regarding your UTI. Return to the Emergency Department if you have any new or worsening symptoms, or if you have any concerns.    Chronic Back Pain  When back pain lasts longer than 3 months, it is called chronic back pain. The cause of your back pain may not be known. Some common causes include:  Wear and tear (degenerative disease) of the bones, ligaments, or disks in your back.  Inflammation and stiffness in your back (arthritis).  People who have chronic back pain often go through certain periods in which the pain is more intense (flare-ups). Many people can learn to manage the pain with home care.    Follow these instructions at home:  Pay attention to any changes in your symptoms. Take these actions to help with your pain:    Activity     Image Image Image Image Image   Avoid bending and other activities that make the problem worse.  Maintain a proper position when standing or sitting:  When standing, keep your upper back and neck straight, with your shoulders pulled back. Avoid slouching.  When sitting, keep your back straight and relax your shoulders. Do not round your shoulders or pull them backward.  Do not sit or  one place for long periods of time.  Take brief periods of rest throughout the day. This will reduce your pain. Resting in a lying or standing position is usually better than sitting to rest.  When you are resting for longer periods, mix in some mild activity or stretching between periods of rest. This will help to prevent stiffness and pain.  Get regular exercise. Ask your health care provider what activities are safe for you.  Do not lift anything that is heavier than 10 lb (4.5 kg). Always use proper lifting technique, which includes:  Bending your knees.  Keeping the load close to your body.  Avoiding twisting.  Sleep on a firm mattress in a comfortable position. Try lying on your side with your knees slightly bent. If you lie on your back, put a pillow under your knees.  Managing pain     If directed, apply ice to the painful area. Your health care provider may recommend applying ice during the first 24–48 hours after a flare-up begins.  Put ice in a plastic bag.  Place a towel between your skin and the bag.  Leave the ice on for 20 minutes, 2–3 times per day.  If directed, apply heat to the affected area as often as told by your health care provider. Use the heat source that your health care provider recommends, such as a moist heat pack or a heating pad.  Place a towel between your skin and the heat source.  Leave the heat on for 20–30 minutes.  Remove the heat if your skin turns bright red. This is especially important if you are unable to feel pain, heat, or cold. You may have a greater risk of getting burned.  Try soaking in a warm tub.  Take over-the-counter and prescription medicines only as told by your health care provider.  Keep all follow-up visits as told by your health care provider. This is important.  Contact a health care provider if:  You have pain that is not relieved with rest or medicine.  Get help right away if:  You have weakness or numbness in one or both of your legs or feet.  You have trouble controlling your bladder or your bowels.  You have nausea or vomiting.  You have pain in your abdomen.  You have shortness of breath or you faint.  This information is not intended to replace advice given to you by your health care provider. Make sure you discuss any questions you have with your health care provider.    Urinary Tract Infection in Women    WHAT YOU NEED TO KNOW:    A urinary tract infection (UTI) is caused by bacteria that get inside your urinary tract. Most bacteria that enter your urinary tract come out when you urinate. If the bacteria stay in your urinary tract, you may get an infection. Your urinary tract includes your kidneys, ureters, bladder, and urethra. Urine is made in your kidneys, and it flows from the ureters to the bladder. Urine leaves the bladder through the urethra. A UTI is more common in your lower urinary tract, which includes your bladder and urethra. Female Urinary System         DISCHARGE INSTRUCTIONS:    Return to the emergency department if:     You are urinating very little or not at all.      You have a high fever with shaking chills.       You have side or back pain that gets worse.    Contact your healthcare provider if:     You have a fever.      You do not feel better after 2 days of taking antibiotics.      You are vomiting.       You have questions or concerns about your condition or care.    Medicines:     Antibiotics help fight a bacterial infection.       Medicines may be given to decrease pain and burning when you urinate. They will also help decrease the feeling that you need to urinate often. These medicines will make your urine orange or red.      Take your medicine as directed. Contact your healthcare provider if you think your medicine is not helping or if you have side effects. Tell him or her if you are allergic to any medicine. Keep a list of the medicines, vitamins, and herbs you take. Include the amounts, and when and why you take them. Bring the list or the pill bottles to follow-up visits. Carry your medicine list with you in case of an emergency.    Follow up with your healthcare provider as directed: Write down your questions so you remember to ask them during your visits.     Prevent another UTI:     Empty your bladder often. Urinate and empty your bladder as soon as you feel the need. Do not hold your urine for long periods of time.      Wipe from front to back after you urinate or have a bowel movement. This will help prevent germs from getting into your urinary tract through your urethra.      Drink liquids as directed. Ask how much liquid to drink each day and which liquids are best for you. You may need to drink more liquids than usual to help flush out the bacteria. Do not drink alcohol, caffeine, or citrus juices. These can irritate your bladder and increase your symptoms. Your healthcare provider may recommend cranberry juice to help prevent a UTI.      Urinate after you have sex. This can help flush out bacteria passed during sex.      Do not douche or use feminine deodorants. These can change the chemical balance in your vagina.      Change sanitary pads or tampons often. This will help prevent germs from getting into your urinary tract.       Do pelvic muscle exercises often. Pelvic muscle exercises may help you start and stop urinating. Strong pelvic muscles may help you empty your bladder easier. Squeeze these muscles tightly for 5 seconds like you are trying to hold back urine. Then relax for 5 seconds. Gradually work up to squeezing for 10 seconds. Do 3 sets of 15 repetitions a day, or as directed.

## 2019-05-28 LAB
CULTURE RESULTS: SIGNIFICANT CHANGE UP
SPECIMEN SOURCE: SIGNIFICANT CHANGE UP

## 2019-05-29 ENCOUNTER — EMERGENCY (EMERGENCY)
Facility: HOSPITAL | Age: 59
LOS: 1 days | Discharge: HOME | End: 2019-05-29
Attending: EMERGENCY MEDICINE | Admitting: EMERGENCY MEDICINE
Payer: SELF-PAY

## 2019-05-29 VITALS
HEART RATE: 93 BPM | RESPIRATION RATE: 18 BRPM | DIASTOLIC BLOOD PRESSURE: 88 MMHG | HEIGHT: 64 IN | OXYGEN SATURATION: 97 % | SYSTOLIC BLOOD PRESSURE: 163 MMHG | WEIGHT: 128.09 LBS | TEMPERATURE: 98 F

## 2019-05-29 VITALS
RESPIRATION RATE: 18 BRPM | OXYGEN SATURATION: 96 % | TEMPERATURE: 98 F | SYSTOLIC BLOOD PRESSURE: 143 MMHG | DIASTOLIC BLOOD PRESSURE: 71 MMHG | HEART RATE: 71 BPM

## 2019-05-29 DIAGNOSIS — M54.9 DORSALGIA, UNSPECIFIED: ICD-10-CM

## 2019-05-29 DIAGNOSIS — Z88.6 ALLERGY STATUS TO ANALGESIC AGENT: ICD-10-CM

## 2019-05-29 DIAGNOSIS — Z88.1 ALLERGY STATUS TO OTHER ANTIBIOTIC AGENTS STATUS: ICD-10-CM

## 2019-05-29 DIAGNOSIS — N39.0 URINARY TRACT INFECTION, SITE NOT SPECIFIED: ICD-10-CM

## 2019-05-29 DIAGNOSIS — Z88.8 ALLERGY STATUS TO OTHER DRUGS, MEDICAMENTS AND BIOLOGICAL SUBSTANCES: ICD-10-CM

## 2019-05-29 DIAGNOSIS — Z79.2 LONG TERM (CURRENT) USE OF ANTIBIOTICS: ICD-10-CM

## 2019-05-29 DIAGNOSIS — Z79.899 OTHER LONG TERM (CURRENT) DRUG THERAPY: ICD-10-CM

## 2019-05-29 DIAGNOSIS — R11.0 NAUSEA: ICD-10-CM

## 2019-05-29 DIAGNOSIS — Z88.0 ALLERGY STATUS TO PENICILLIN: ICD-10-CM

## 2019-05-29 DIAGNOSIS — Z79.891 LONG TERM (CURRENT) USE OF OPIATE ANALGESIC: ICD-10-CM

## 2019-05-29 LAB
APPEARANCE UR: CLEAR — SIGNIFICANT CHANGE UP
BACTERIA # UR AUTO: PRESENT /HPF
BILIRUB UR-MCNC: NEGATIVE — SIGNIFICANT CHANGE UP
COLOR SPEC: YELLOW — SIGNIFICANT CHANGE UP
DIFF PNL FLD: NEGATIVE — SIGNIFICANT CHANGE UP
EPI CELLS # UR: SIGNIFICANT CHANGE UP /HPF (ref 0–5)
GLUCOSE UR QL: NEGATIVE — SIGNIFICANT CHANGE UP
KETONES UR-MCNC: NEGATIVE — SIGNIFICANT CHANGE UP
LEUKOCYTE ESTERASE UR-ACNC: ABNORMAL
NITRITE UR-MCNC: NEGATIVE — SIGNIFICANT CHANGE UP
PH UR: 6 — SIGNIFICANT CHANGE UP (ref 5–8)
PROT UR-MCNC: NEGATIVE MG/DL — SIGNIFICANT CHANGE UP
RBC CASTS # UR COMP ASSIST: < 5 /HPF — SIGNIFICANT CHANGE UP
SP GR SPEC: 1.01 — SIGNIFICANT CHANGE UP (ref 1–1.03)
UROBILINOGEN FLD QL: 0.2 E.U./DL — SIGNIFICANT CHANGE UP
WBC UR QL: < 5 /HPF — SIGNIFICANT CHANGE UP

## 2019-05-29 PROCEDURE — 81001 URINALYSIS AUTO W/SCOPE: CPT

## 2019-05-29 PROCEDURE — 99283 EMERGENCY DEPT VISIT LOW MDM: CPT

## 2019-05-29 PROCEDURE — 87086 URINE CULTURE/COLONY COUNT: CPT

## 2019-05-29 RX ORDER — OXYCODONE AND ACETAMINOPHEN 5; 325 MG/1; MG/1
1 TABLET ORAL ONCE
Refills: 0 | Status: DISCONTINUED | OUTPATIENT
Start: 2019-05-29 | End: 2019-05-29

## 2019-05-29 RX ORDER — AZTREONAM 2 G
1 VIAL (EA) INJECTION
Qty: 20 | Refills: 0
Start: 2019-05-29 | End: 2019-06-07

## 2019-05-29 RX ORDER — CLONAZEPAM 1 MG
0.5 TABLET ORAL ONCE
Refills: 0 | Status: DISCONTINUED | OUTPATIENT
Start: 2019-05-29 | End: 2019-05-29

## 2019-05-29 RX ADMIN — OXYCODONE AND ACETAMINOPHEN 1 TABLET(S): 5; 325 TABLET ORAL at 13:00

## 2019-05-29 RX ADMIN — Medication 1 TABLET(S): at 13:00

## 2019-05-29 RX ADMIN — Medication 0.5 MILLIGRAM(S): at 13:52

## 2019-05-29 RX ADMIN — OXYCODONE AND ACETAMINOPHEN 1 TABLET(S): 5; 325 TABLET ORAL at 14:05

## 2019-05-29 NOTE — ED PROVIDER NOTE - NSFOLLOWUPINSTRUCTIONS_ED_ALL_ED_FT
Please see your primary care provider in 24-48 hours.  Return to the ER if symptoms worsen or other concerns.    Urinary Tract Infection    A urinary tract infection (UTI) is an infection of any part of the urinary tract, which includes the kidneys, ureters, bladder, and urethra. Risk factors include ignoring your need to urinate, wiping back to front if female, being an uncircumcised male, and having diabetes or a weak immune system. Symptoms include frequent urination, pain or burning with urination, foul smelling urine, cloudy urine, pain in the lower abdomen, blood in the urine, and fever. If you were prescribed an antibiotic medicine, take it as told by your health care provider. Do not stop taking the antibiotic even if you start to feel better.    SEEK IMMEDIATE MEDICAL CARE IF YOU HAVE ANY OF THE FOLLOWING SYMPTOMS: severe back or abdominal pain, fever, inability to keep fluids or medicine down, dizziness/lightheadedness, or a change in mental status.    Back Pain    Back pain is very common in adults. The cause of back pain is rarely dangerous and the pain often gets better over time. The cause of your back pain may not be known and may include strain of muscles or ligaments, degeneration of the spinal disks, or arthritis. Occasionally the pain may radiate down your leg(s). Over-the-counter medicines to reduce pain and inflammation are often the most helpful. Stretching and remaining active frequently helps the healing process.     SEEK IMMEDIATE MEDICAL CARE IF YOU HAVE ANY OF THE FOLLOWING SYMPTOMS: bowel or bladder control problems, unusual weakness or numbness in your arms or legs, nausea or vomiting, abdominal pain, fever, dizziness/lightheadedness. Continue with tylenol 2 pills every 6 hours as needed for pain.  Please see your primary care provider in 24-48 hours.  Return to the ER if symptoms worsen or other concerns.    Urinary Tract Infection    A urinary tract infection (UTI) is an infection of any part of the urinary tract, which includes the kidneys, ureters, bladder, and urethra. Risk factors include ignoring your need to urinate, wiping back to front if female, being an uncircumcised male, and having diabetes or a weak immune system. Symptoms include frequent urination, pain or burning with urination, foul smelling urine, cloudy urine, pain in the lower abdomen, blood in the urine, and fever. If you were prescribed an antibiotic medicine, take it as told by your health care provider. Do not stop taking the antibiotic even if you start to feel better.    SEEK IMMEDIATE MEDICAL CARE IF YOU HAVE ANY OF THE FOLLOWING SYMPTOMS: severe back or abdominal pain, fever, inability to keep fluids or medicine down, dizziness/lightheadedness, or a change in mental status.    Back Pain    Back pain is very common in adults. The cause of back pain is rarely dangerous and the pain often gets better over time. The cause of your back pain may not be known and may include strain of muscles or ligaments, degeneration of the spinal disks, or arthritis. Occasionally the pain may radiate down your leg(s). Over-the-counter medicines to reduce pain and inflammation are often the most helpful. Stretching and remaining active frequently helps the healing process.     SEEK IMMEDIATE MEDICAL CARE IF YOU HAVE ANY OF THE FOLLOWING SYMPTOMS: bowel or bladder control problems, unusual weakness or numbness in your arms or legs, nausea or vomiting, abdominal pain, fever, dizziness/lightheadedness.

## 2019-05-29 NOTE — ED ADULT TRIAGE NOTE - CHIEF COMPLAINT QUOTE
pt states "Dr. Lombardo told me to come back to change my antibiotic." pt reports she was here two days ago for low back pain, dx with UTI and sent home on antibiotics. pt states she is unable to tolerate macrobid and told the PA that but still was prescribed that medication. pt states "I only tolerate Bactrim."

## 2019-05-29 NOTE — ED ADULT NURSE NOTE - OBJECTIVE STATEMENT
Patient returned to ED s/p allergic reaction to microbid. Patient states after taking 1 dose she developed hives. Patient took benadryl and symptoms resolved. She currently c/o right flank pain. She denies burning during urination or hematuria.

## 2019-05-29 NOTE — ED ADULT NURSE NOTE - NSIMPLEMENTINTERV_GEN_ALL_ED
Implemented All Universal Safety Interventions:  [place] to call system. Call bell, personal items and telephone within reach. Instruct patient to call for assistance. Room bathroom lighting operational. Non-slip footwear when patient is off stretcher. Physically safe environment: no spills, clutter or unnecessary equipment. Stretcher in lowest position, wheels locked, appropriate side rails in place.

## 2019-05-29 NOTE — ED PROVIDER NOTE - OBJECTIVE STATEMENT
history of chronic intermittent back pain here with increased pain in right flank.  States she was seen here 2 days ago and diagnosed with uti.  Prescribed macrobid but didn't fill rx because she had reaction to it in the past.  Hasn't taken anything for pain today.  Denies fever, says she always feels cold and sometimes has nausea.  Denies abdominal pain, trauma.  Goes to yoga but skipped past 2 days due to symptoms.  No weakness, bowel/ urinary symptoms.

## 2019-05-29 NOTE — ED PROVIDER NOTE - CLINICAL SUMMARY MEDICAL DECISION MAKING FREE TEXT BOX
suspect exacerbation of chronic musculoskeletal back pain but patient does report tenderness over right kidney.  prior ua suggestive of uti but uculture contaminated.  will resend.  no secondary signs of pyelo (afebrile, well appearing, no vomiting).  will treat with bactrim (states she tolerated before).  no signs of cauda equina clinically.  one dose of percocet in the ed but will not send rx as appears comfortable and has pmd for f/u next week for further assessment.  prn tylenol at home.  return precautions discussed suspect exacerbation of chronic musculoskeletal back pain but patient does report tenderness over right kidney.  prior ua suggestive of uti but uculture contaminated.  will resend.  no secondary signs of pyelo (afebrile, well appearing, no vomiting).  will treat with bactrim (states she tolerated before).  no signs of cauda equina clinically.  one dose of percocet in the ed.  requested rx and discussed that percocet best prescribed by pmd.  will give 5 pills pending f/u if trouble sleeping at night. f/u with pmd for further assessment.  prn tylenol at home.  return precautions discussed

## 2019-05-30 LAB
CULTURE RESULTS: NO GROWTH — SIGNIFICANT CHANGE UP
SPECIMEN SOURCE: SIGNIFICANT CHANGE UP

## 2019-05-31 DIAGNOSIS — Z88.0 ALLERGY STATUS TO PENICILLIN: ICD-10-CM

## 2019-05-31 DIAGNOSIS — Z88.8 ALLERGY STATUS TO OTHER DRUGS, MEDICAMENTS AND BIOLOGICAL SUBSTANCES: ICD-10-CM

## 2019-05-31 DIAGNOSIS — R39.89 OTHER SYMPTOMS AND SIGNS INVOLVING THE GENITOURINARY SYSTEM: ICD-10-CM

## 2019-05-31 DIAGNOSIS — Z79.899 OTHER LONG TERM (CURRENT) DRUG THERAPY: ICD-10-CM

## 2019-05-31 DIAGNOSIS — M54.5 LOW BACK PAIN: ICD-10-CM

## 2019-05-31 DIAGNOSIS — Z79.891 LONG TERM (CURRENT) USE OF OPIATE ANALGESIC: ICD-10-CM

## 2019-06-10 ENCOUNTER — EMERGENCY (EMERGENCY)
Facility: HOSPITAL | Age: 59
LOS: 1 days | Discharge: HOME | End: 2019-06-10
Admitting: EMERGENCY MEDICINE
Payer: SELF-PAY

## 2019-06-10 VITALS
HEART RATE: 79 BPM | DIASTOLIC BLOOD PRESSURE: 85 MMHG | OXYGEN SATURATION: 100 % | HEIGHT: 64 IN | TEMPERATURE: 98 F | WEIGHT: 127.87 LBS | RESPIRATION RATE: 18 BRPM | SYSTOLIC BLOOD PRESSURE: 160 MMHG

## 2019-06-10 DIAGNOSIS — M54.5 LOW BACK PAIN: ICD-10-CM

## 2019-06-10 DIAGNOSIS — F41.9 ANXIETY DISORDER, UNSPECIFIED: ICD-10-CM

## 2019-06-10 DIAGNOSIS — R10.9 UNSPECIFIED ABDOMINAL PAIN: ICD-10-CM

## 2019-06-10 LAB
APPEARANCE UR: CLEAR — SIGNIFICANT CHANGE UP
BILIRUB UR-MCNC: NEGATIVE — SIGNIFICANT CHANGE UP
COLOR SPEC: YELLOW — SIGNIFICANT CHANGE UP
DIFF PNL FLD: ABNORMAL
GLUCOSE UR QL: NEGATIVE — SIGNIFICANT CHANGE UP
KETONES UR-MCNC: NEGATIVE — SIGNIFICANT CHANGE UP
LEUKOCYTE ESTERASE UR-ACNC: ABNORMAL
NITRITE UR-MCNC: NEGATIVE — SIGNIFICANT CHANGE UP
PH UR: 6 — SIGNIFICANT CHANGE UP (ref 5–8)
PROT UR-MCNC: NEGATIVE MG/DL — SIGNIFICANT CHANGE UP
SP GR SPEC: 1.02 — SIGNIFICANT CHANGE UP (ref 1–1.03)
UROBILINOGEN FLD QL: 0.2 E.U./DL — SIGNIFICANT CHANGE UP

## 2019-06-10 PROCEDURE — 87086 URINE CULTURE/COLONY COUNT: CPT

## 2019-06-10 PROCEDURE — 99284 EMERGENCY DEPT VISIT MOD MDM: CPT | Mod: 25

## 2019-06-10 PROCEDURE — 81001 URINALYSIS AUTO W/SCOPE: CPT

## 2019-06-10 PROCEDURE — 99284 EMERGENCY DEPT VISIT MOD MDM: CPT

## 2019-06-10 PROCEDURE — 74176 CT ABD & PELVIS W/O CONTRAST: CPT

## 2019-06-10 PROCEDURE — 74176 CT ABD & PELVIS W/O CONTRAST: CPT | Mod: 26

## 2019-06-10 RX ORDER — CLONAZEPAM 1 MG
1 TABLET ORAL
Qty: 2 | Refills: 0
Start: 2019-06-10 | End: 2019-06-11

## 2019-06-10 RX ORDER — CLONAZEPAM 1 MG
1 TABLET ORAL ONCE
Refills: 0 | Status: DISCONTINUED | OUTPATIENT
Start: 2019-06-10 | End: 2019-06-10

## 2019-06-10 RX ORDER — ACETAMINOPHEN 500 MG
975 TABLET ORAL ONCE
Refills: 0 | Status: COMPLETED | OUTPATIENT
Start: 2019-06-10 | End: 2019-06-10

## 2019-06-10 RX ADMIN — Medication 1 MILLIGRAM(S): at 18:55

## 2019-06-10 RX ADMIN — Medication 975 MILLIGRAM(S): at 18:56
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
## 2019-06-10 NOTE — ED ADULT NURSE NOTE - OBJECTIVE STATEMENT
58 year old F patient with c/o of Rflank pain [id number] not relieved by bactrim.  Denies burning on urination, denies frequency of urine.  A+OX3, ambulatory w steady gait.

## 2019-06-10 NOTE — ED ADULT NURSE NOTE - CHPI ED NUR SYMPTOMS NEG
no decreased eating/drinking/no dizziness/no nausea/no fever/no tingling/no vomiting/no weakness/no chills
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
## 2019-06-10 NOTE — ED PROVIDER NOTE - PHYSICAL EXAMINATION
CONSTITUTIONAL: Well-appearing; well-nourished; in no apparent distress.   HEAD: Normocephalic; atraumatic.   EYES: PERRL; EOM intact; conjunctiva and sclera clear  ENT: normal nose; no rhinorrhea; normal pharynx with no erythema or lesions.   NECK: Supple; non-tender; no LAD  CARDIOVASCULAR: Normal S1, S2; no murmurs, rubs, or gallops. Regular rate and rhythm.   RESPIRATORY: Breathing easily; breath sounds clear and equal bilaterally; no wheezes, rhonchi, or rales.  GI: Soft; non-distended; non-tender; no palpable organomegaly. Mild R CVAT  MSK: FROM at all extremities, normal tone   EXT: No cyanosis or edema; N/V intact  SKIN: Normal for age and race; warm; dry; good turgor; no apparent lesions or rash.   NEURO: A & O x 3; face symmetric; grossly unremarkable.   PSYCHOLOGICAL: The patient’s mood and manner are appropriate.

## 2019-06-12 LAB
CULTURE RESULTS: SIGNIFICANT CHANGE UP
SPECIMEN SOURCE: SIGNIFICANT CHANGE UP

## 2019-07-29 ENCOUNTER — EMERGENCY (EMERGENCY)
Facility: HOSPITAL | Age: 59
LOS: 1 days | Discharge: HOME | End: 2019-07-29
Attending: EMERGENCY MEDICINE | Admitting: EMERGENCY MEDICINE
Payer: SELF-PAY

## 2019-07-29 VITALS
TEMPERATURE: 98 F | DIASTOLIC BLOOD PRESSURE: 76 MMHG | WEIGHT: 123.9 LBS | HEART RATE: 91 BPM | SYSTOLIC BLOOD PRESSURE: 153 MMHG | RESPIRATION RATE: 18 BRPM | OXYGEN SATURATION: 96 % | HEIGHT: 64 IN

## 2019-07-29 VITALS
SYSTOLIC BLOOD PRESSURE: 153 MMHG | TEMPERATURE: 98 F | DIASTOLIC BLOOD PRESSURE: 87 MMHG | HEART RATE: 74 BPM | OXYGEN SATURATION: 98 % | RESPIRATION RATE: 16 BRPM

## 2019-07-29 PROCEDURE — 99285 EMERGENCY DEPT VISIT HI MDM: CPT | Mod: 25

## 2019-07-29 PROCEDURE — 71045 X-RAY EXAM CHEST 1 VIEW: CPT

## 2019-07-29 PROCEDURE — 71045 X-RAY EXAM CHEST 1 VIEW: CPT | Mod: 26

## 2019-07-29 PROCEDURE — 80053 COMPREHEN METABOLIC PANEL: CPT

## 2019-07-29 PROCEDURE — 84484 ASSAY OF TROPONIN QUANT: CPT

## 2019-07-29 PROCEDURE — 85025 COMPLETE CBC W/AUTO DIFF WBC: CPT

## 2019-07-29 PROCEDURE — 99283 EMERGENCY DEPT VISIT LOW MDM: CPT

## 2019-07-29 RX ORDER — CLONAZEPAM 1 MG
1 TABLET ORAL ONCE
Refills: 0 | Status: DISCONTINUED | OUTPATIENT
Start: 2019-07-29 | End: 2019-07-29

## 2019-07-29 RX ORDER — NITROGLYCERIN 6.5 MG
0.4 CAPSULE, EXTENDED RELEASE ORAL
Refills: 0 | Status: DISCONTINUED | OUTPATIENT
Start: 2019-07-29 | End: 2019-08-09

## 2019-07-29 RX ORDER — ESCITALOPRAM OXALATE 10 MG/1
20 TABLET, FILM COATED ORAL ONCE
Refills: 0 | Status: DISCONTINUED | OUTPATIENT
Start: 2019-07-29 | End: 2019-08-09

## 2019-07-29 RX ORDER — ASPIRIN/CALCIUM CARB/MAGNESIUM 324 MG
325 TABLET ORAL ONCE
Refills: 0 | Status: COMPLETED | OUTPATIENT
Start: 2019-07-29 | End: 2019-07-29

## 2019-07-29 RX ADMIN — Medication 325 MILLIGRAM(S): at 15:14

## 2019-07-29 RX ADMIN — Medication 1 MILLIGRAM(S): at 15:14

## 2019-07-29 NOTE — ED PROVIDER NOTE - CARE PROVIDER_API CALL
Ezekiel Monroe)  Cardiovascular Disease  [address] [place] [address]  Phone: [phone]  Fax: [phone]  Follow Up Time:

## 2019-07-29 NOTE — ED PROVIDER NOTE - CLINICAL SUMMARY MEDICAL DECISION MAKING FREE TEXT BOX
Pt previously healthy with complaints of chest pain since 230am this morning, tightness , radiaion to L shoulder, no assoc diaphoresis, nausea, vomiting. Had ECHO one month ago, normal per pt, no recent stress testing/cath. no worsening sx on exertion. Consider ACS, low risk [place], well appearing, no distress. will obtain cardiac evaluation and reeval.
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
## 2019-07-29 NOTE — ED PROVIDER NOTE - PROGRESS NOTE DETAILS
thourough discussion regarding need for admission given sx and EKG, pt states does not wanted to be evaluated, then disc obtaining CTA cardiac and reevaluation. States does not want IV or contrast for concern for allergy. Does not want to wait for rpt trop. Would like to leave and follow up with own cardiologist. Pt to leave AMA.     Patient desires to leave emergency department against medical advice, prior to completion of my desired evaluation and treatment plan.  Patient's mental status is normal, patient is fully alert and oriented x person, place and time.  Patient demonstrates clear reasoning capabilities and capacity to make this decision.  Patient fully understands the explained risks involved with this decision including worsening of medical condition, missed and or delayed diagnosis, permanent disability and death.  All alternative options given to patient and still desires discharge against medical advice from ED and will follow up as an outpatient.  Patient given the option to return to ED at any time to have further evaluation and treatment. thourough discussion regarding need for admission given sx and EKG, pt states does not wanted to be evaluated, then disc obtaining CTA cardiac and reevaluation. States does not want IV or contrast for concern for allergy. Does not want to wait for rpt trop. Would like to leave and follow up with own cardiologist. Pt to leave AMA.     Patient desires to leave emergency department against medical advice, prior to completion of my desired evaluation and treatment plan.  Patient's mental status is normal, patient is fully alert and oriented x person, place and time.  Patient demonstrates clear reasoning capabilities and capacity to make this decision.  Patient fully understands the explained risks involved with this decision including worsening of medical condition, missed and or delayed diagnosis, permanent disability and death.  All alternative options given to patient and still desires discharge against medical advice from ED and will follow up as an outpatient.  Patient given the option to return to ED at any time to have further evaluation and treatment. Refusing to sign AMA papers.

## 2019-07-29 NOTE — ED PROVIDER NOTE - OBJECTIVE STATEMENT
Pt previously healthy with complaints of chest pain since 230am this morning, tightness , radiaion to L shoulder, no assoc diaphoresis, nausea, vomiting. Had ECHO one month ago, normal per pt, no recent stress testing/cath. no worsening sx on exertion.

## 2019-07-29 NOTE — ED PROVIDER NOTE - DIAGNOSTIC INTERPRETATION
ER Physician: Silvana Sigala MD  CHEST XRAY INTERPRETATION: lungs clear, heart shadow normal, bony structures intact

## 2019-07-29 NOTE — ED ADULT NURSE NOTE - NSIMPLEMENTINTERV_GEN_ALL_ED
Implemented All Universal Safety Interventions:  [place] to call system. Call bell, personal items and telephone within reach. Instruct patient to call for assistance. Room bathroom lighting operational. Non-slip footwear when patient is off stretcher. Physically safe environment: no spills, clutter or unnecessary equipment. Stretcher in lowest position, wheels locked, appropriate side rails in place.

## 2019-07-29 NOTE — ED PROVIDER NOTE - NSFOLLOWUPINSTRUCTIONS_ED_ALL_ED_FT
Nonspecific Chest Pain  Chest pain can be caused by many different conditions. It can be caused by a condition that is life-threatening and requires treatment right away. It can also be caused by something that is not life-threatening. If you have chest pain, it can be hard to know the difference, so it is important to get help right away to make sure that you do not have a serious condition.    Some life-threatening causes of chest pain include:  Heart attack.  A tear in the body's main blood vessel (aortic dissection).  Inflammation around your heart (pericarditis).  A problem in the lungs, such as a blood clot (pulmonary embolism) or a collapsed lung (pneumothorax).  Some non life-threatening causes of chest pain include:  Heartburn.  Anxiety or stress.  Damage to the bones, muscles, and cartilage that make up your chest wall.  Pneumonia or bronchitis.  Shingles infection (varicella-zoster virus).  Chest pain can feel like:  Pain or discomfort on the surface of your chest or deep in your chest.  Crushing, pressure, aching, or squeezing pain.  Burning or tingling.  Dull or sharp pain that is worse when you move, cough, or take a deep breath.  Pain or discomfort that is also felt in your back, neck, jaw, shoulder, or arm, or pain that spreads to any of these areas.  Your chest pain may come and go. It may also be constant. Your health care provider will do lab tests and other studies to find the cause of your pain. Treatment will depend on the cause of your chest pain.    Follow these instructions at home:  Pay attention to any changes in your symptoms. Tell your health care provider about them or any new symptoms. Follow these instructions from your health care provider.    Medicines     Take over-the-counter and prescription medicines only as told by your health care provider.  If you were prescribed an antibiotic, take it as told by your health care provider. Do not stop taking the antibiotic even if you start to feel better.  Lifestyle     Image   Rest as directed by your health care provider.  Do not use any products that contain nicotine or tobacco, such as cigarettes and e-cigarettes. If you need help quitting, ask your health care provider.  Do not drink alcohol.  Make healthy lifestyle choices as recommended. These may include:  Getting regular exercise. Ask your health care provider to suggest some activities that are safe for you.  Eating a heart-healthy diet. This includes plenty of fresh fruits and vegetables, whole grains, low-fat (lean) protein, and low-fat dairy products. A dietitian can help you find healthy eating options.  Maintaining a healthy weight.  Managing any other health conditions you have, such as hypertension or diabetes.  Reducing stress, such as with yoga or relaxation techniques.  General instructions     Avoid any activities that bring on chest pain.  Keep all follow-up visits as told by your health care provider. This is important. This includes visits for any further testing if your chest pain does not go away.  Contact a health care provider if:  Your chest pain does not go away.  You feel depressed.  You have a fever.  Get help right away if:  Your chest pain gets worse.  You have a cough that gets worse, or you cough up blood.  You have severe pain in your abdomen.  You faint.  You have sudden, unexplained chest discomfort.  You have sudden, unexplained discomfort in your arms, back, neck, or jaw.  You have shortness of breath at any time.  You suddenly start to sweat, or your skin gets clammy.  You feel nausea or you vomit.  You suddenly feel lightheaded or dizzy.  You have severe weakness, or unexplained weakness or fatigue.  Your heart begins to beat quickly, or it feels like it is skipping beats.  These symptoms may represent a serious problem that is an emergency. Do not wait to see if the symptoms will go away. Get medical help right away. Call your local emergency services (911 in the [place] Do not drive yourself to the hospital.     Summary  Chest pain can be caused by a condition that is serious and requires urgent treatment. It may also be caused by something that is not life-threatening.  If you have chest pain, it is very important to see your health care provider. Your health care provider may do lab tests and other studies to find the cause of your pain.  Follow your health care provider's instructions on taking medicines, making lifestyle changes, and getting emergency treatment if symptoms become worse.  Keep all follow-up visits as told by your health care provider. This includes visits for any further testing if your chest pain does not go away.  This information is not intended to replace advice given to you by your health care provider. Make sure you discuss any questions you have with your health care provider.    Document Released: 09/27/2006 Document Revised: 01/30/2019 Document Reviewed: 01/30/2019

## 2019-07-29 NOTE — ED ADULT TRIAGE NOTE - CHIEF COMPLAINT QUOTE
"I have had chest pain on and off for about 6 weeks but I had a really sharp pain this morning and I am very concerned".

## 2019-08-04 DIAGNOSIS — Z88.5 ALLERGY STATUS TO NARCOTIC AGENT: ICD-10-CM

## 2019-08-04 DIAGNOSIS — Z88.1 ALLERGY STATUS TO OTHER ANTIBIOTIC AGENTS STATUS: ICD-10-CM

## 2019-08-04 DIAGNOSIS — Z88.0 ALLERGY STATUS TO PENICILLIN: ICD-10-CM

## 2019-08-04 DIAGNOSIS — Z88.6 ALLERGY STATUS TO ANALGESIC AGENT: ICD-10-CM

## 2019-08-04 DIAGNOSIS — Z79.891 LONG TERM (CURRENT) USE OF OPIATE ANALGESIC: ICD-10-CM

## 2019-08-04 DIAGNOSIS — Z79.2 LONG TERM (CURRENT) USE OF ANTIBIOTICS: ICD-10-CM

## 2019-08-04 DIAGNOSIS — R07.89 OTHER CHEST PAIN: ICD-10-CM

## 2019-08-04 DIAGNOSIS — Z79.899 OTHER LONG TERM (CURRENT) DRUG THERAPY: ICD-10-CM

## 2019-08-21 ENCOUNTER — EMERGENCY (EMERGENCY)
Facility: HOSPITAL | Age: 59
LOS: 1 days | Discharge: HOME | End: 2019-08-21
Attending: EMERGENCY MEDICINE | Admitting: EMERGENCY MEDICINE
Payer: SELF-PAY

## 2019-08-21 VITALS
TEMPERATURE: 99 F | RESPIRATION RATE: 18 BRPM | DIASTOLIC BLOOD PRESSURE: 79 MMHG | HEART RATE: 91 BPM | WEIGHT: 123.02 LBS | OXYGEN SATURATION: 99 % | SYSTOLIC BLOOD PRESSURE: 135 MMHG

## 2019-08-21 VITALS
SYSTOLIC BLOOD PRESSURE: 158 MMHG | RESPIRATION RATE: 18 BRPM | HEART RATE: 76 BPM | OXYGEN SATURATION: 98 % | TEMPERATURE: 98 F | DIASTOLIC BLOOD PRESSURE: 88 MMHG

## 2019-08-21 DIAGNOSIS — R07.89 OTHER CHEST PAIN: ICD-10-CM

## 2019-08-21 LAB
ALBUMIN SERPL ELPH-MCNC: 4.1 G/DL — SIGNIFICANT CHANGE UP (ref 3.3–5)
ALP SERPL-CCNC: 85 U/L — SIGNIFICANT CHANGE UP (ref 40–120)
ALT FLD-CCNC: 13 U/L — SIGNIFICANT CHANGE UP (ref 10–45)
ANION GAP SERPL CALC-SCNC: 13 MMOL/L — SIGNIFICANT CHANGE UP (ref 5–17)
APTT BLD: 30.1 SEC — SIGNIFICANT CHANGE UP (ref 27.5–36.3)
AST SERPL-CCNC: 15 U/L — SIGNIFICANT CHANGE UP (ref 10–40)
BASOPHILS # BLD AUTO: 0.1 K/UL — SIGNIFICANT CHANGE UP (ref 0–0.2)
BASOPHILS NFR BLD AUTO: 1.2 % — SIGNIFICANT CHANGE UP (ref 0–2)
BILIRUB SERPL-MCNC: 0.2 MG/DL — SIGNIFICANT CHANGE UP (ref 0.2–1.2)
BUN SERPL-MCNC: 18 MG/DL — SIGNIFICANT CHANGE UP (ref 7–23)
CALCIUM SERPL-MCNC: 9.2 MG/DL — SIGNIFICANT CHANGE UP (ref 8.4–10.5)
CHLORIDE SERPL-SCNC: 105 MMOL/L — SIGNIFICANT CHANGE UP (ref 96–108)
CK MB CFR SERPL CALC: 1.4 NG/ML — SIGNIFICANT CHANGE UP (ref 0–6.7)
CO2 SERPL-SCNC: 21 MMOL/L — LOW (ref 22–31)
CREAT SERPL-MCNC: 0.75 MG/DL — SIGNIFICANT CHANGE UP (ref 0.5–1.3)
EOSINOPHIL # BLD AUTO: 0.33 K/UL — SIGNIFICANT CHANGE UP (ref 0–0.5)
EOSINOPHIL NFR BLD AUTO: 4 % — SIGNIFICANT CHANGE UP (ref 0–6)
GLUCOSE SERPL-MCNC: 76 MG/DL — SIGNIFICANT CHANGE UP (ref 70–99)
HCT VFR BLD CALC: 40.2 % — SIGNIFICANT CHANGE UP (ref 34.5–45)
HGB BLD-MCNC: 13.2 G/DL — SIGNIFICANT CHANGE UP (ref 11.5–15.5)
IMM GRANULOCYTES NFR BLD AUTO: 0.2 % — SIGNIFICANT CHANGE UP (ref 0–1.5)
LYMPHOCYTES # BLD AUTO: 2.31 K/UL — SIGNIFICANT CHANGE UP (ref 1–3.3)
LYMPHOCYTES # BLD AUTO: 28 % — SIGNIFICANT CHANGE UP (ref 13–44)
MCHC RBC-ENTMCNC: 32.3 PG — SIGNIFICANT CHANGE UP (ref 27–34)
MCHC RBC-ENTMCNC: 32.8 GM/DL — SIGNIFICANT CHANGE UP (ref 32–36)
MCV RBC AUTO: 98.3 FL — SIGNIFICANT CHANGE UP (ref 80–100)
MONOCYTES # BLD AUTO: 0.91 K/UL — HIGH (ref 0–0.9)
MONOCYTES NFR BLD AUTO: 11 % — SIGNIFICANT CHANGE UP (ref 2–14)
NEUTROPHILS # BLD AUTO: 4.59 K/UL — SIGNIFICANT CHANGE UP (ref 1.8–7.4)
NEUTROPHILS NFR BLD AUTO: 55.6 % — SIGNIFICANT CHANGE UP (ref 43–77)
NRBC # BLD: 0 /100 WBCS — SIGNIFICANT CHANGE UP (ref 0–0)
PLATELET # BLD AUTO: 303 K/UL — SIGNIFICANT CHANGE UP (ref 150–400)
POTASSIUM SERPL-MCNC: 4 MMOL/L — SIGNIFICANT CHANGE UP (ref 3.5–5.3)
POTASSIUM SERPL-SCNC: 4 MMOL/L — SIGNIFICANT CHANGE UP (ref 3.5–5.3)
PROT SERPL-MCNC: 7.1 G/DL — SIGNIFICANT CHANGE UP (ref 6–8.3)
RBC # BLD: 4.09 M/UL — SIGNIFICANT CHANGE UP (ref 3.8–5.2)
RBC # FLD: 13.1 % — SIGNIFICANT CHANGE UP (ref 10.3–14.5)
SODIUM SERPL-SCNC: 139 MMOL/L — SIGNIFICANT CHANGE UP (ref 135–145)
TROPONIN T SERPL-MCNC: <0.01 NG/ML — SIGNIFICANT CHANGE UP (ref 0–0.01)
WBC # BLD: 8.26 K/UL — SIGNIFICANT CHANGE UP (ref 3.8–10.5)
WBC # FLD AUTO: 8.26 K/UL — SIGNIFICANT CHANGE UP (ref 3.8–10.5)

## 2019-08-21 PROCEDURE — 80053 COMPREHEN METABOLIC PANEL: CPT

## 2019-08-21 PROCEDURE — 71046 X-RAY EXAM CHEST 2 VIEWS: CPT | Mod: 26

## 2019-08-21 PROCEDURE — 71046 X-RAY EXAM CHEST 2 VIEWS: CPT

## 2019-08-21 PROCEDURE — 99285 EMERGENCY DEPT VISIT HI MDM: CPT | Mod: 25

## 2019-08-21 PROCEDURE — 36415 COLL VENOUS BLD VENIPUNCTURE: CPT

## 2019-08-21 PROCEDURE — 82553 CREATINE MB FRACTION: CPT

## 2019-08-21 PROCEDURE — 85730 THROMBOPLASTIN TIME PARTIAL: CPT

## 2019-08-21 PROCEDURE — 84484 ASSAY OF TROPONIN QUANT: CPT

## 2019-08-21 PROCEDURE — 82550 ASSAY OF CK (CPK): CPT

## 2019-08-21 PROCEDURE — 85025 COMPLETE CBC W/AUTO DIFF WBC: CPT

## 2019-08-21 PROCEDURE — 99283 EMERGENCY DEPT VISIT LOW MDM: CPT | Mod: 25

## 2019-08-21 RX ORDER — ACETAMINOPHEN 500 MG
650 TABLET ORAL ONCE
Refills: 0 | Status: COMPLETED | OUTPATIENT
Start: 2019-08-21 | End: 2019-08-21

## 2019-08-21 RX ORDER — METOCLOPRAMIDE HCL 10 MG
10 TABLET ORAL ONCE
Refills: 0 | Status: DISCONTINUED | OUTPATIENT
Start: 2019-08-21 | End: 2019-08-21

## 2019-08-21 RX ORDER — CLONAZEPAM 1 MG
2 TABLET ORAL ONCE
Refills: 0 | Status: DISCONTINUED | OUTPATIENT
Start: 2019-08-21 | End: 2019-08-21

## 2019-08-21 RX ORDER — METOCLOPRAMIDE HCL 10 MG
10 TABLET ORAL ONCE
Refills: 0 | Status: COMPLETED | OUTPATIENT
Start: 2019-08-21 | End: 2019-08-21

## 2019-08-21 RX ORDER — ESCITALOPRAM OXALATE 10 MG/1
20 TABLET, FILM COATED ORAL ONCE
Refills: 0 | Status: COMPLETED | OUTPATIENT
Start: 2019-08-21 | End: 2019-08-21

## 2019-08-21 RX ADMIN — Medication 2 MILLIGRAM(S): at 15:23

## 2019-08-21 RX ADMIN — Medication 650 MILLIGRAM(S): at 15:23

## 2019-08-21 RX ADMIN — Medication 10 MILLIGRAM(S): at 16:18

## 2019-08-21 RX ADMIN — ESCITALOPRAM OXALATE 20 MILLIGRAM(S): 10 TABLET, FILM COATED ORAL at 15:23

## 2019-08-21 NOTE — ED ADULT NURSE NOTE - NSIMPLEMENTINTERV_GEN_ALL_ED
Implemented All Universal Safety Interventions:  [place] to call system. Call bell, personal items and telephone within reach. Instruct patient to call for assistance. Room bathroom lighting operational. Non-slip footwear when patient is off stretcher. Physically safe environment: no spills, clutter or unnecessary equipment. Stretcher in lowest position, wheels locked, appropriate side rails in place.

## 2019-08-21 NOTE — ED ADULT NURSE NOTE - CHPI ED NUR SYMPTOMS NEG
no diaphoresis/no back pain/no shortness of breath/no chills/no dizziness/no nausea/no congestion/no syncope/no fever

## 2019-08-21 NOTE — ED PROVIDER NOTE - ATTENDING CONTRIBUTION TO CARE
57 y/o F with PMHx of anxiety, migraines and low BP presents to ED today with intermittent chest pain x 2 months, last episode at 10:30 AM today. No fevers, chills, cough, vomiting, palpitations, or sweating. Pt AAO, NAD, RRR, CTA b/l, abd: soft and NT. EKG is normal and PE is unremarkable. Labs/ X-Ray with no acute findings. Trop negative. Pt already has f/up. Return precautions given.

## 2019-08-21 NOTE — ED PROVIDER NOTE - NS ED ROS FT
General: no fever, chills, confusion  Cardiac: chest pain, no palpitations  Lungs: no sob, difficulty breathing  Abdomen: no abdominal pain, nausea, vomiting, diarrhea, constipation, nml BM  : no dysuria, urinary frequency/urgency    All other systems negative except as per HPI

## 2019-08-21 NOTE — ED ADULT TRIAGE NOTE - OTHER COMPLAINTS
Patient reports chest pain today and also reports difficulty catching breath. Speaking in full sentences.

## 2019-08-21 NOTE — ED PROVIDER NOTE - DIAGNOSTIC INTERPRETATION
ER PA: Rosanna Sam, PAC  CHEST XRAY INTERPRETATION: lungs clear, heart shadow normal, bony structures intact

## 2019-08-21 NOTE — ED PROVIDER NOTE - OBJECTIVE STATEMENT
59 y/o F with PMHx of migraines, low BP, and anxiety presents to ED complaining of intermittent mid sternal chest pain x 2 months, with episodes that are initially sharp and lasting for 20 minutes, then with lasting tightness afterwards and patient develops a migraine similar to previous. Patient seen in ED for this at the end of July and labs were within normal limits, patient followed up with PMD and referred to specialist with multiple appointments in [place]. Today was going to the airport in a cab and felt pain again with associated mild SOB and nausea. Denies fevers, chills, cough, vomiting, palpitations, sweating, smoking, FHx of CAD. Suffers from anxiety but symptoms are not similar to her anxiety, last panic attack 4 years ago. Takes Lexapro and Klonopin at 8 AM QD, but did not take it today because her  had the medicine.

## 2019-08-21 NOTE — ED ADULT NURSE REASSESSMENT NOTE - NS ED NURSE REASSESS COMMENT FT1
patient refusing IV access at this time. Labs sent via butterfly. RISHI Sam endorsed and medications to be given PO. DISPLAY PLAN FREE TEXT

## 2019-08-21 NOTE — ED ADULT NURSE NOTE - OBJECTIVE STATEMENT
Patient c/o intermittent chest pain for 3 months. She states today she was on her way to the airport and began experiencing chest pain. She states she had another episode recently and was evaluated at [facility] and was told "My blood tests were normal. Even that funny one, trop something." Patient denies cardiac history. She takes medications for anxiety. She also states "I made a lot of appointments all around [place] because [facility] said I had to follow up."

## 2019-08-21 NOTE — ED PROVIDER NOTE - CLINICAL SUMMARY MEDICAL DECISION MAKING FREE TEXT BOX
57 y/o F with PMHx of anxiety, migraines and low Bp presents to ED today with intermittent chest pain x 2 months, last episode at 10:30 AM today. No fevers, chills, cough, vomiting, palpitations, or sweating. EKG is normal and PE is unremarkable. Plan for labs, X-Ray. If troponin negative and x-ray unremarkable will discharge patient due to follow-up already in place.

## 2019-08-25 ENCOUNTER — EMERGENCY (EMERGENCY)
Facility: HOSPITAL | Age: 59
LOS: 1 days | Discharge: HOME | End: 2019-08-25
Attending: EMERGENCY MEDICINE | Admitting: EMERGENCY MEDICINE
Payer: SELF-PAY

## 2019-08-25 VITALS
SYSTOLIC BLOOD PRESSURE: 150 MMHG | DIASTOLIC BLOOD PRESSURE: 85 MMHG | OXYGEN SATURATION: 95 % | HEART RATE: 74 BPM | RESPIRATION RATE: 16 BRPM | TEMPERATURE: 98 F

## 2019-08-25 VITALS
HEART RATE: 83 BPM | RESPIRATION RATE: 17 BRPM | OXYGEN SATURATION: 96 % | DIASTOLIC BLOOD PRESSURE: 87 MMHG | SYSTOLIC BLOOD PRESSURE: 159 MMHG | TEMPERATURE: 98 F

## 2019-08-25 LAB
ANION GAP SERPL CALC-SCNC: 15 MMOL/L — SIGNIFICANT CHANGE UP (ref 5–17)
BASOPHILS # BLD AUTO: 0.07 K/UL — SIGNIFICANT CHANGE UP (ref 0–0.2)
BASOPHILS NFR BLD AUTO: 1.2 % — SIGNIFICANT CHANGE UP (ref 0–2)
BUN SERPL-MCNC: 14 MG/DL — SIGNIFICANT CHANGE UP (ref 7–23)
CALCIUM SERPL-MCNC: 9.4 MG/DL — SIGNIFICANT CHANGE UP (ref 8.4–10.5)
CHLORIDE SERPL-SCNC: 105 MMOL/L — SIGNIFICANT CHANGE UP (ref 96–108)
CO2 SERPL-SCNC: 24 MMOL/L — SIGNIFICANT CHANGE UP (ref 22–31)
CREAT SERPL-MCNC: 0.69 MG/DL — SIGNIFICANT CHANGE UP (ref 0.5–1.3)
D DIMER BLD IA.RAPID-MCNC: <150 NG/ML DDU — SIGNIFICANT CHANGE UP
EOSINOPHIL # BLD AUTO: 0.19 K/UL — SIGNIFICANT CHANGE UP (ref 0–0.5)
EOSINOPHIL NFR BLD AUTO: 3.2 % — SIGNIFICANT CHANGE UP (ref 0–6)
GLUCOSE SERPL-MCNC: 94 MG/DL — SIGNIFICANT CHANGE UP (ref 70–99)
HCT VFR BLD CALC: 41 % — SIGNIFICANT CHANGE UP (ref 34.5–45)
HGB BLD-MCNC: 13.8 G/DL — SIGNIFICANT CHANGE UP (ref 11.5–15.5)
IMM GRANULOCYTES NFR BLD AUTO: 0.2 % — SIGNIFICANT CHANGE UP (ref 0–1.5)
LYMPHOCYTES # BLD AUTO: 2.04 K/UL — SIGNIFICANT CHANGE UP (ref 1–3.3)
LYMPHOCYTES # BLD AUTO: 33.9 % — SIGNIFICANT CHANGE UP (ref 13–44)
MCHC RBC-ENTMCNC: 32.6 PG — SIGNIFICANT CHANGE UP (ref 27–34)
MCHC RBC-ENTMCNC: 33.7 GM/DL — SIGNIFICANT CHANGE UP (ref 32–36)
MCV RBC AUTO: 96.9 FL — SIGNIFICANT CHANGE UP (ref 80–100)
MONOCYTES # BLD AUTO: 0.45 K/UL — SIGNIFICANT CHANGE UP (ref 0–0.9)
MONOCYTES NFR BLD AUTO: 7.5 % — SIGNIFICANT CHANGE UP (ref 2–14)
NEUTROPHILS # BLD AUTO: 3.25 K/UL — SIGNIFICANT CHANGE UP (ref 1.8–7.4)
NEUTROPHILS NFR BLD AUTO: 54 % — SIGNIFICANT CHANGE UP (ref 43–77)
NRBC # BLD: 0 /100 WBCS — SIGNIFICANT CHANGE UP (ref 0–0)
PLATELET # BLD AUTO: 341 K/UL — SIGNIFICANT CHANGE UP (ref 150–400)
POTASSIUM SERPL-MCNC: 4.4 MMOL/L — SIGNIFICANT CHANGE UP (ref 3.5–5.3)
POTASSIUM SERPL-SCNC: 4.4 MMOL/L — SIGNIFICANT CHANGE UP (ref 3.5–5.3)
RBC # BLD: 4.23 M/UL — SIGNIFICANT CHANGE UP (ref 3.8–5.2)
RBC # FLD: 12.8 % — SIGNIFICANT CHANGE UP (ref 10.3–14.5)
SODIUM SERPL-SCNC: 144 MMOL/L — SIGNIFICANT CHANGE UP (ref 135–145)
TROPONIN T SERPL-MCNC: <0.01 NG/ML — SIGNIFICANT CHANGE UP (ref 0–0.01)
TROPONIN T SERPL-MCNC: <0.01 NG/ML — SIGNIFICANT CHANGE UP (ref 0–0.01)
WBC # BLD: 6.01 K/UL — SIGNIFICANT CHANGE UP (ref 3.8–10.5)
WBC # FLD AUTO: 6.01 K/UL — SIGNIFICANT CHANGE UP (ref 3.8–10.5)

## 2019-08-25 PROCEDURE — 36415 COLL VENOUS BLD VENIPUNCTURE: CPT

## 2019-08-25 PROCEDURE — 85379 FIBRIN DEGRADATION QUANT: CPT

## 2019-08-25 PROCEDURE — 99285 EMERGENCY DEPT VISIT HI MDM: CPT | Mod: 25

## 2019-08-25 PROCEDURE — 80048 BASIC METABOLIC PNL TOTAL CA: CPT

## 2019-08-25 PROCEDURE — 71046 X-RAY EXAM CHEST 2 VIEWS: CPT | Mod: 26

## 2019-08-25 PROCEDURE — 99283 EMERGENCY DEPT VISIT LOW MDM: CPT | Mod: 25

## 2019-08-25 PROCEDURE — 85025 COMPLETE CBC W/AUTO DIFF WBC: CPT

## 2019-08-25 PROCEDURE — 84484 ASSAY OF TROPONIN QUANT: CPT

## 2019-08-25 PROCEDURE — 71046 X-RAY EXAM CHEST 2 VIEWS: CPT

## 2019-08-25 RX ORDER — ESCITALOPRAM OXALATE 10 MG/1
20 TABLET, FILM COATED ORAL ONCE
Refills: 0 | Status: COMPLETED | OUTPATIENT
Start: 2019-08-25 | End: 2019-08-25

## 2019-08-25 RX ORDER — ESZOPICLONE 2 MG/1
0 TABLET, COATED ORAL
Qty: 0 | Refills: 0 | DISCHARGE

## 2019-08-25 RX ORDER — METOCLOPRAMIDE HCL 10 MG
10 TABLET ORAL ONCE
Refills: 0 | Status: COMPLETED | OUTPATIENT
Start: 2019-08-25 | End: 2019-08-25

## 2019-08-25 RX ORDER — CLONAZEPAM 1 MG
2 TABLET ORAL ONCE
Refills: 0 | Status: DISCONTINUED | OUTPATIENT
Start: 2019-08-25 | End: 2019-08-25

## 2019-08-25 RX ORDER — ACETAMINOPHEN 500 MG
975 TABLET ORAL ONCE
Refills: 0 | Status: COMPLETED | OUTPATIENT
Start: 2019-08-25 | End: 2019-08-25

## 2019-08-25 RX ORDER — SUMATRIPTAN SUCCINATE 4 MG/.5ML
25 INJECTION, SOLUTION SUBCUTANEOUS ONCE
Refills: 0 | Status: COMPLETED | OUTPATIENT
Start: 2019-08-25 | End: 2019-08-25

## 2019-08-25 RX ADMIN — Medication 2 MILLIGRAM(S): at 12:15

## 2019-08-25 RX ADMIN — Medication 10 MILLIGRAM(S): at 14:03

## 2019-08-25 RX ADMIN — SUMATRIPTAN SUCCINATE 25 MILLIGRAM(S): 4 INJECTION, SOLUTION SUBCUTANEOUS at 12:15

## 2019-08-25 RX ADMIN — Medication 975 MILLIGRAM(S): at 12:45

## 2019-08-25 RX ADMIN — ESCITALOPRAM OXALATE 20 MILLIGRAM(S): 10 TABLET, FILM COATED ORAL at 12:15

## 2019-08-25 RX ADMIN — Medication 975 MILLIGRAM(S): at 12:15

## 2019-08-25 NOTE — ED PROVIDER NOTE - DIAGNOSTIC INTERPRETATION
ER Physician:  Nichole Director  CHEST XRAY INTERPRETATION: lungs clear, heart shadow normal, bony structures intact

## 2019-08-25 NOTE — ED PROVIDER NOTE - NEUROLOGICAL, MLM
awake, alert, oriented x 3, CN II-XII grossly intact, motor 5/5, no gross sens deficits except baseline lle decreased sensation to light touch, gait steady, no ataxia, speech clear.

## 2019-08-25 NOTE — ED PROVIDER NOTE - CLINICAL SUMMARY MEDICAL DECISION MAKING FREE TEXT BOX
Pt c/o cp, sob, palpitations w visits for similar recently.  EKG nl.  Sx atypical for acs, only tob risk.  No pe risks.  No pna sx.  ? anxiety related since pt missed her nl psych meds.  Plan 2 troponins, cxr, pain meds, anxiety meds.  Pt also c/o ha similar to prior migraine; no acute neuro deficits. will try imitrex, tylenol.

## 2019-08-25 NOTE — ED ADULT NURSE NOTE - OBJECTIVE STATEMENT
Pt reports chest tightness since Friday, sharp chest pain and sob starting today radiating to right shoulder.  Pt also reports headache and nausea starting today as well. Pt denies cough, dizziness, vomiting, changes in vision, weakness or any other symptoms. Pt refusing IV at this time. MD Director made aware. Pt speaking in full sentences.

## 2019-08-25 NOTE — ED PROVIDER NOTE - NSFOLLOWUPINSTRUCTIONS_ED_ALL_ED_FT
Chest pain  Headache    Continue your normal medications.  Use tylenol as needed for pain as directed.  Follow up as planned in [place] for further evaluation of your symptoms.      Chest Pain    Chest pain can be caused by many different conditions which may or may not be dangerous. Causes include heartburn, lung infections, heart attack, blood clot in lungs, skin infections, strain or damage to muscle, cartilage, or bones, etc. In addition to a history and physical examination, an electrocardiogram (ECG) or other lab tests may have been performed to determine the cause of your chest pain. Follow up with your primary care provider or with a cardiologist as instructed.     SEEK IMMEDIATE MEDICAL CARE IF YOU HAVE ANY OF THE FOLLOWING SYMPTOMS: worsening chest pain, coughing up blood, unexplained back/neck/jaw pain, severe abdominal pain, dizziness or lightheadedness, fainting, shortness of breath, sweaty or clammy skin, vomiting, or racing heart beat. These symptoms may represent a serious problem that is an emergency. Do not wait to see if the symptoms will go away. Get medical help right away. Call 911 and do not drive yourself to the hospital.    Headache    A headache is pain or discomfort felt around the head or neck area. The specific cause of a headache may not be found as there are many types including tension headaches, migraine headaches, and cluster headaches. Watch your condition for any changes. Things you can do to manage your pain include taking over the counter and prescription medications as instructed by your health care provider, lying down in a dark quiet room, limiting stress, getting regular sleep, and refraining from alcohol and tobacco products.    SEEK IMMEDIATE MEDICAL CARE IF YOU HAVE ANY OF THE FOLLOWING SYMPTOMS: fever, vomiting, stiff neck, loss of vision, problems with speech, muscle weakness, loss of balance, trouble walking, passing out, or confusion.
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
## 2019-08-25 NOTE — ED PROVIDER NOTE - OBJECTIVE STATEMENT
59 yo female h/o migraine, sciatica, anxiety seen 7/29 and 8/21 for c/o cp w nl ekg, troponins and cxr at that time returns c/o sharp L sided cp x 15 min associated w sob and mild palpitations while she was shopping at [employer] ~ 1030 a today.  Pt feels cp is much less 2/10 from 7/10 and sob improved.  Pt reports nl echo in the past and has appts in [place] this week for eval of her sx but no other recent cardiac testing. No htn, hld, dm, + prior tob, no fh cad.  No le pain/swelling, fever, cough, uri sx, h/o dvt/pe.  Pt thinks her sx could be from anxiety - states her  took her meds to [place] and last had her klonopin and lexapro yest.  No abd pain, gerd.  No recent exertional cp/sob, change in sx today w activity.  Pt also c/o frontal ha w/o change in vision/speech/gait, weakness in ext  + baseline numbness LLE 2/2 her sciatica - unchanged today.   [place] somewhat similar to prior migraine but no migraine x 4 yr; + prev relief w imitrex.  No uri sx, fever, photophobia, head trauma.  Ha 6/10.

## 2019-08-29 DIAGNOSIS — R06.02 SHORTNESS OF BREATH: ICD-10-CM

## 2019-08-29 DIAGNOSIS — R51 HEADACHE: ICD-10-CM

## 2019-08-29 DIAGNOSIS — R07.89 OTHER CHEST PAIN: ICD-10-CM

## 2019-09-01 ENCOUNTER — EMERGENCY (EMERGENCY)
Facility: HOSPITAL | Age: 59
LOS: 1 days | Discharge: HOME | End: 2019-09-01
Attending: EMERGENCY MEDICINE | Admitting: EMERGENCY MEDICINE
Payer: SELF-PAY

## 2019-09-01 VITALS
WEIGHT: 124.34 LBS | TEMPERATURE: 99 F | SYSTOLIC BLOOD PRESSURE: 180 MMHG | DIASTOLIC BLOOD PRESSURE: 85 MMHG | OXYGEN SATURATION: 94 % | HEART RATE: 94 BPM | RESPIRATION RATE: 18 BRPM

## 2019-09-01 VITALS
HEART RATE: 74 BPM | DIASTOLIC BLOOD PRESSURE: 83 MMHG | RESPIRATION RATE: 18 BRPM | SYSTOLIC BLOOD PRESSURE: 171 MMHG | OXYGEN SATURATION: 96 % | TEMPERATURE: 98 F

## 2019-09-01 PROCEDURE — 99284 EMERGENCY DEPT VISIT MOD MDM: CPT

## 2019-09-01 PROCEDURE — 99283 EMERGENCY DEPT VISIT LOW MDM: CPT

## 2019-09-01 RX ORDER — ACETAMINOPHEN 500 MG
650 TABLET ORAL ONCE
Refills: 0 | Status: COMPLETED | OUTPATIENT
Start: 2019-09-01 | End: 2019-09-01

## 2019-09-01 RX ORDER — ESCITALOPRAM OXALATE 10 MG/1
20 TABLET, FILM COATED ORAL DAILY
Refills: 0 | Status: DISCONTINUED | OUTPATIENT
Start: 2019-09-01 | End: 2019-09-11

## 2019-09-01 RX ORDER — CLONAZEPAM 1 MG
2 TABLET ORAL ONCE
Refills: 0 | Status: DISCONTINUED | OUTPATIENT
Start: 2019-09-01 | End: 2019-09-01

## 2019-09-01 RX ADMIN — Medication 2 MILLIGRAM(S): at 18:54

## 2019-09-01 RX ADMIN — ESCITALOPRAM OXALATE 20 MILLIGRAM(S): 10 TABLET, FILM COATED ORAL at 18:52

## 2019-09-01 RX ADMIN — Medication 650 MILLIGRAM(S): at 18:54

## 2019-09-01 RX ADMIN — Medication 650 MILLIGRAM(S): at 18:59

## 2019-09-01 NOTE — ED ADULT NURSE REASSESSMENT NOTE - NS ED NURSE REASSESS COMMENT FT1
pt bp 171/83 upon discharge home, denies cp, dizziness, MD Breed made aware, as per MD pt OK to be discharge. Instructed to f/u with primary provider, verbalized understanding.

## 2019-09-01 NOTE — ED PROVIDER NOTE - PHYSICAL EXAMINATION
VITAL SIGNS: I have reviewed nursing notes and confirm.  CONSTITUTIONAL: Well-developed; well-nourished; in no acute distress.  SKIN: Skin is warm and dry, no acute rash.  HEAD: Normocephalic; atraumatic.  EYES:  EOM intact; conjunctiva and sclera clear.  ENT: No nasal discharge; airway clear. TM clear bilateral, no appreciated rupture, no effusion, no blood collection, no erythema or discharge.   NECK: Supple; Voluntary FROM  CARD: No rubs appreciated, Regular rate and rhythm.  RESP: No wheezes, no rales. No respiratory distress  ABD: Soft; non-distended; non-tender; no rebound or guarding  EXT: Normal ROM. No cyanosis or edema.  NEURO: Patient is alert, oriented x person, place and time.  Cranial nerves 2-12 are intact.  Normal gait and speech.  Cerebellar testing normal:  negative Romberg, normal coordination and normal finger to nose, heal to shin and rapid alternating movements.  Normal proprioception and sensory exam.  No pronator drift.  5/5 bl upper extremity and lower extremity strength.   PSYCH: Cooperative, appropriate.

## 2019-09-01 NOTE — ED PROVIDER NOTE - OBJECTIVE STATEMENT
58F pmh migraine, sciatica, anxiety, seen 7/29, 8/21, 8/25 for c/o cp w nl ekg, troponins & cxr on each visit most recently w/ cp s/p shopping at [employer] w/ elevated BP at [employer]. Today p/w ear ache, facial pain and then dizziness while shopping at [employer].     Per pt & prior notes - nl echo, has f/u in [place] for further eval of cp symptoms.     Previous notes pt w/o her home klonopin/lexapro due to  leaving to [place] w/ meds.     Prior visits pt also had weakness in ext  + baseline numbness LLE 2/2 her sciatica - unchanged today.     [place] somewhat similar to prior migraine but no migraine x 4 yr; + prev relief w imitrex. 58F pmh migraine, sciatica, anxiety w/ PTSD, seen 7/29, 8/21, 8/25 for c/o cp w nl ekg, troponins & cxr on each visit most recently w/ cp s/p shopping at [employer] w/ elevated BP at [employer]. Today p/w R ear ache, facial pain and then dizziness while shopping at [employer]. Pt states R ear pain started yesterday after landing at airport, no pain during flight, no popping sensation, is dull ache, constant, no fevers, no discharge, no blood. Had mild L forehead HA, similar to prior headaches, but did not want to take her home medications. Otherwise feeling well and was going to ignore her symptoms, but today, PTA pt felt sudden onset vertigo for short duration while shopping at [employer]. Lasted few seconds, not lightheaded, no palpitations, no diaphoresis, no cp, no n/v, no fall, no sob. This made her feel more anxious and so she came to the ED. Pt feels it may be due to her not taking her klonopin/lexapro because her  has it w/ him and he returns home tomorrow. Is requested dose here. Feels baseline now except for mild R ear discomfort.     Per pt & prior notes - nl echo, has f/u in [place] for further eval of cp symptoms as well as neurology.

## 2019-09-01 NOTE — ED PROVIDER NOTE - CLINICAL SUMMARY MEDICAL DECISION MAKING FREE TEXT BOX
58F pmh migraine, sciatica, anxiety w/ PTSD, seen 7/29, 8/21, 8/25 for c/o cp w nl ekg, troponins & cxr on each visit most recently w/ cp s/p shopping at [employer] w/ elevated BP at [employer]. Today p/w R ear ache, facial pain and then dizziness while shopping at [employer]. Pt states R ear pain started yesterday after landing at airport, no pain during flight, no popping sensation, is dull ache, constant, no fevers, no discharge, no blood. Had mild L forehead HA, similar to prior headaches, but did not want to take her home medications. Otherwise feeling well and was going to ignore her symptoms, but today, PTA pt felt sudden onset vertigo for short duration while shopping at [employer]. Lasted few seconds, not lightheaded, no palpitations, no diaphoresis, no cp, no n/v, no fall, no sob. This made her feel more anxious and so she came to the ED. Pt feels it may be due to her not taking her klonopin/lexapro because her  has it w/ him and he returns home tomorrow. Is requested dose here. Feels baseline now except for mild R ear discomfort. Per pt & prior notes - nl echo, has f/u in [place] for further eval of cp symptoms as well as neurology. Exam without acute findings. Pt feeling very well, nearly all symptoms resolved except for ear discomfort. Requesting benzo & lexapro. Concern ear ache, no e/o otitis media or externa, no ruptured TM, no vesicles; vertigo brief and sudden onset, likely BPPV, exam & hx not consistent w/ cerebellar stroke or ICH at this time. Pt seeking 1x dose home meds and discharge.

## 2019-09-01 NOTE — ED ADULT NURSE NOTE - OBJECTIVE STATEMENT
59 y/o female c/o intermittent right ear pain since this morning, also reports having a episode of dizziness this afternoon while walking. denies cp,  blurred vision, sob, vertigo sensation. reports coming from [place] last night and feeling "pressure in her ear" . denies fall or injuries.

## 2019-09-01 NOTE — ED PROVIDER NOTE - PROGRESS NOTE DETAILS
Feeling much improved, no acute life threatening illness. Pt seeking discharge. Declined blood work, has plan for f/u with pmd wednesday

## 2019-09-01 NOTE — ED PROVIDER NOTE - NSFOLLOWUPINSTRUCTIONS_ED_ALL_ED_FT
Immediately return to the Emergency Department or call 911 for any high fever, trouble breathing, severe vomiting, worsening pain, or any other concerns.    An earache, or ear pain, can be caused by many things, including:  An infection.  Ear wax buildup.  Ear pressure.  Something in the ear that should not be there (foreign body).  A sore throat.  Tooth problems.  Jaw problems.  Treatment of the earache will depend on the cause. If the cause is not clear or cannot be determined, you may need to watch your symptoms until your earache goes away or until a cause is found.    Follow these instructions at home:  Pay attention to any changes in your symptoms. Take these actions to help with your pain:  Take or apply over-the-counter and prescription medicines only as told by your health care provider.  If you were prescribed an antibiotic medicine, use it as told by your health care provider. Do not stop using the antibiotic even if you start to feel better.  Do not put anything in your ear other than medicine that is prescribed by your health care provider.  If directed, apply heat to the affected area as often as told by your health care provider. Use the heat source that your health care provider recommends, such as a moist heat pack or a heating pad.  Place a towel between your skin and the heat source.  Leave the heat on for 20–30 minutes.  Remove the heat if your skin turns bright red. This is especially important if you are unable to feel pain, heat, or cold. You may have a greater risk of getting burned.  If directed, put ice on the ear:  Put ice in a plastic bag.  Place a towel between your skin and the bag.  Leave the ice on for 20 minutes, 2–3 times a day.  Try resting in an upright position instead of lying down. This may help to reduce pressure in your ear and relieve pain.  Chew gum if it helps to relieve your ear pain.  Treat any allergies as told by your health care provider.  Keep all follow-up visits as told by your health care provider. This is important.  Contact a health care provider if:  Your pain does not improve within 2 days.  Your earache gets worse.  You have new symptoms.  You have a fever.  Get help right away if:  You have a severe headache.  You have a stiff neck.  You have trouble swallowing.  You have redness or swelling behind your ear.  You have fluid or blood coming from your ear.  You have hearing loss.  You feel dizzy.  This information is not intended to replace advice given to you by your health care provider. Make sure you discuss any questions you have with your health care provider.    Vertigo  Vertigo is the feeling that you or your surroundings are moving when they are not. Vertigo can be dangerous if it occurs while you are doing something that could endanger you or others, such as driving.    What are the causes?  This condition is caused by a disturbance in the signals that are sent by your body’s sensory systems to your brain. Different causes of a disturbance can lead to vertigo, including:  Infections, especially in the inner ear.  A bad reaction to a drug, or misuse of alcohol and medicines.  Withdrawal from drugs or alcohol.  Quickly changing positions, as when lying down or rolling over in bed.  Migraine headaches.  Decreased blood flow to the brain.  Decreased blood pressure.  Increased pressure in the brain from a head or neck injury, stroke, infection, tumor, or bleeding.  Central nervous system disorders.  What are the signs or symptoms?  Symptoms of this condition usually occur when you move your head or your eyes in different directions. Symptoms may start suddenly, and they usually last for less than a minute. Symptoms may include:  Loss of balance and falling.  Feeling like you are spinning or moving.  Feeling like your surroundings are spinning or moving.  Nausea and vomiting.  Blurred vision or double vision.  Difficulty hearing.  Slurred speech.  Dizziness.  Involuntary eye movement (nystagmus).  Symptoms can be mild and cause only slight annoyance, or they can be severe and interfere with daily life. Episodes of vertigo may return (recur) over time, and they are often triggered by certain movements. Symptoms may improve over time.    How is this diagnosed?  This condition may be diagnosed based on medical history and the quality of your nystagmus. Your health care provider may test your eye movements by asking you to quickly change positions to trigger the nystagmus. This may be called the Lizbeth-Hallpike test, head thrust test, or roll test. You may be referred to a health care provider who specializes in ear, nose, and throat (ENT) problems (otolaryngologist) or a provider who specializes in disorders of the central nervous system (neurologist).    You may have additional testing, including:  A physical exam.  Blood tests.  MRI.  A CT scan.  An electrocardiogram (ECG). This records electrical activity in your heart.  An electroencephalogram (EEG). This records electrical activity in your brain.  Hearing tests.  How is this treated?  Treatment for this condition depends on the cause and the severity of the symptoms. Treatment options include:  Medicines to treat nausea or vertigo. These are usually used for severe cases. Some medicines that are used to treat other conditions may also reduce or eliminate vertigo symptoms. These include:  Medicines that control allergies (antihistamines).  Medicines that control seizures (anticonvulsants).  Medicines that relieve depression (antidepressants).  Medicines that relieve anxiety (sedatives).  Head movements to adjust your inner ear back to normal. If your vertigo is caused by an ear problem, your health care provider may recommend certain movements to correct the problem.  Surgery. This is rare.  Follow these instructions at home:  Safety     Move slowly.Avoid sudden body or head movements.  Avoid driving.  Avoid operating heavy machinery.  Avoid doing any tasks that would cause danger to you or others if you would have a vertigo episode during the task.  If you have trouble walking or keeping your balance, try using a cane for stability. If you feel dizzy or unstable, sit down right away.  Return to your normal activities as told by your health care provider. Ask your health care provider what activities are safe for you.  General instructions     Take over-the-counter and prescription medicines only as told by your health care provider.  Avoid certain positions or movements as told by your health care provider.  Drink enough fluid to keep your urine clear or pale yellow.  Keep all follow-up visits as told by your health care provider. This is important.  Contact a health care provider if:  Your medicines do not relieve your vertigo or they make it worse.  You have a fever.  Your condition gets worse or you develop new symptoms.  Your family or friends notice any behavioral changes.  Your nausea or vomiting gets worse.  You have numbness or a “pins and needles” sensation in part of your body.  Get help right away if:  You have difficulty moving or speaking.  You are always dizzy.  You faint.  You develop severe headaches.  You have weakness in your hands, arms, or legs.  You have changes in your hearing or vision.  You develop a stiff neck.  You develop sensitivity to light.  This information is not intended to replace advice given to you by your health care provider. Make sure you discuss any questions you have with your health care provider.

## 2019-09-01 NOTE — ED ADULT NURSE NOTE - NSIMPLEMENTINTERV_GEN_ALL_ED
Implemented All Universal Safety Interventions:  [place] to call system. Call bell, personal items and telephone within reach. Instruct patient to call for assistance. Room bathroom lighting operational. Non-slip footwear when patient is off stretcher. Physically safe environment: no spills, clutter or unnecessary equipment. Stretcher in lowest position, wheels locked, appropriate side rails in place.

## 2019-09-05 DIAGNOSIS — R51 HEADACHE: ICD-10-CM

## 2019-09-05 DIAGNOSIS — R42 DIZZINESS AND GIDDINESS: ICD-10-CM

## 2019-09-05 DIAGNOSIS — H92.01 OTALGIA, RIGHT EAR: ICD-10-CM

## 2019-12-23 ENCOUNTER — EMERGENCY (EMERGENCY)
Facility: HOSPITAL | Age: 59
LOS: 1 days | Discharge: HOME | End: 2019-12-23
Attending: EMERGENCY MEDICINE | Admitting: EMERGENCY MEDICINE
Payer: SELF-PAY

## 2019-12-23 VITALS
HEIGHT: 65 IN | OXYGEN SATURATION: 96 % | HEART RATE: 82 BPM | TEMPERATURE: 98 F | DIASTOLIC BLOOD PRESSURE: 80 MMHG | SYSTOLIC BLOOD PRESSURE: 187 MMHG | WEIGHT: 121.47 LBS | RESPIRATION RATE: 18 BRPM

## 2019-12-23 VITALS — HEART RATE: 80 BPM | DIASTOLIC BLOOD PRESSURE: 96 MMHG | SYSTOLIC BLOOD PRESSURE: 168 MMHG

## 2019-12-23 DIAGNOSIS — Z88.0 ALLERGY STATUS TO PENICILLIN: ICD-10-CM

## 2019-12-23 DIAGNOSIS — R00.2 PALPITATIONS: ICD-10-CM

## 2019-12-23 DIAGNOSIS — Z88.8 ALLERGY STATUS TO OTHER DRUGS, MEDICAMENTS AND BIOLOGICAL SUBSTANCES STATUS: ICD-10-CM

## 2019-12-23 DIAGNOSIS — Z88.1 ALLERGY STATUS TO OTHER ANTIBIOTIC AGENTS STATUS: ICD-10-CM

## 2019-12-23 DIAGNOSIS — Z88.6 ALLERGY STATUS TO ANALGESIC AGENT: ICD-10-CM

## 2019-12-23 LAB
ALBUMIN SERPL ELPH-MCNC: 4.3 G/DL — SIGNIFICANT CHANGE UP (ref 3.3–5)
ALP SERPL-CCNC: 79 U/L — SIGNIFICANT CHANGE UP (ref 40–120)
ALT FLD-CCNC: 13 U/L — SIGNIFICANT CHANGE UP (ref 10–45)
ANION GAP SERPL CALC-SCNC: 13 MMOL/L — SIGNIFICANT CHANGE UP (ref 5–17)
APTT BLD: 31.8 SEC — SIGNIFICANT CHANGE UP (ref 27.5–36.3)
AST SERPL-CCNC: 15 U/L — SIGNIFICANT CHANGE UP (ref 10–40)
BASOPHILS # BLD AUTO: 0.09 K/UL — SIGNIFICANT CHANGE UP (ref 0–0.2)
BASOPHILS NFR BLD AUTO: 1.3 % — SIGNIFICANT CHANGE UP (ref 0–2)
BILIRUB SERPL-MCNC: 0.2 MG/DL — SIGNIFICANT CHANGE UP (ref 0.2–1.2)
BUN SERPL-MCNC: 12 MG/DL — SIGNIFICANT CHANGE UP (ref 7–23)
CALCIUM SERPL-MCNC: 9.4 MG/DL — SIGNIFICANT CHANGE UP (ref 8.4–10.5)
CHLORIDE SERPL-SCNC: 107 MMOL/L — SIGNIFICANT CHANGE UP (ref 96–108)
CO2 SERPL-SCNC: 22 MMOL/L — SIGNIFICANT CHANGE UP (ref 22–31)
CREAT SERPL-MCNC: 0.62 MG/DL — SIGNIFICANT CHANGE UP (ref 0.5–1.3)
EOSINOPHIL # BLD AUTO: 0.23 K/UL — SIGNIFICANT CHANGE UP (ref 0–0.5)
EOSINOPHIL NFR BLD AUTO: 3.3 % — SIGNIFICANT CHANGE UP (ref 0–6)
GLUCOSE SERPL-MCNC: 86 MG/DL — SIGNIFICANT CHANGE UP (ref 70–99)
HCT VFR BLD CALC: 37.6 % — SIGNIFICANT CHANGE UP (ref 34.5–45)
HGB BLD-MCNC: 12.7 G/DL — SIGNIFICANT CHANGE UP (ref 11.5–15.5)
IMM GRANULOCYTES NFR BLD AUTO: 0.1 % — SIGNIFICANT CHANGE UP (ref 0–1.5)
INR BLD: 0.94 — SIGNIFICANT CHANGE UP (ref 0.88–1.16)
LYMPHOCYTES # BLD AUTO: 2.56 K/UL — SIGNIFICANT CHANGE UP (ref 1–3.3)
LYMPHOCYTES # BLD AUTO: 37 % — SIGNIFICANT CHANGE UP (ref 13–44)
MAGNESIUM SERPL-MCNC: 2.2 MG/DL — SIGNIFICANT CHANGE UP (ref 1.6–2.6)
MCHC RBC-ENTMCNC: 32.8 PG — SIGNIFICANT CHANGE UP (ref 27–34)
MCHC RBC-ENTMCNC: 33.8 GM/DL — SIGNIFICANT CHANGE UP (ref 32–36)
MCV RBC AUTO: 97.2 FL — SIGNIFICANT CHANGE UP (ref 80–100)
MONOCYTES # BLD AUTO: 0.58 K/UL — SIGNIFICANT CHANGE UP (ref 0–0.9)
MONOCYTES NFR BLD AUTO: 8.4 % — SIGNIFICANT CHANGE UP (ref 2–14)
NEUTROPHILS # BLD AUTO: 3.45 K/UL — SIGNIFICANT CHANGE UP (ref 1.8–7.4)
NEUTROPHILS NFR BLD AUTO: 49.9 % — SIGNIFICANT CHANGE UP (ref 43–77)
NRBC # BLD: 0 /100 WBCS — SIGNIFICANT CHANGE UP (ref 0–0)
PLATELET # BLD AUTO: 308 K/UL — SIGNIFICANT CHANGE UP (ref 150–400)
POTASSIUM SERPL-MCNC: 4.1 MMOL/L — SIGNIFICANT CHANGE UP (ref 3.5–5.3)
POTASSIUM SERPL-SCNC: 4.1 MMOL/L — SIGNIFICANT CHANGE UP (ref 3.5–5.3)
PROT SERPL-MCNC: 6.8 G/DL — SIGNIFICANT CHANGE UP (ref 6–8.3)
PROTHROM AB SERPL-ACNC: 10.6 SEC — SIGNIFICANT CHANGE UP (ref 10–12.9)
RBC # BLD: 3.87 M/UL — SIGNIFICANT CHANGE UP (ref 3.8–5.2)
RBC # FLD: 13.2 % — SIGNIFICANT CHANGE UP (ref 10.3–14.5)
SODIUM SERPL-SCNC: 142 MMOL/L — SIGNIFICANT CHANGE UP (ref 135–145)
TROPONIN T SERPL-MCNC: <0.01 NG/ML — SIGNIFICANT CHANGE UP (ref 0–0.01)
TSH SERPL-MCNC: 0.64 UIU/ML — SIGNIFICANT CHANGE UP (ref 0.35–4.94)
WBC # BLD: 6.92 K/UL — SIGNIFICANT CHANGE UP (ref 3.8–10.5)
WBC # FLD AUTO: 6.92 K/UL — SIGNIFICANT CHANGE UP (ref 3.8–10.5)

## 2019-12-23 PROCEDURE — 85610 PROTHROMBIN TIME: CPT

## 2019-12-23 PROCEDURE — 71046 X-RAY EXAM CHEST 2 VIEWS: CPT

## 2019-12-23 PROCEDURE — 36415 COLL VENOUS BLD VENIPUNCTURE: CPT

## 2019-12-23 PROCEDURE — 84443 ASSAY THYROID STIM HORMONE: CPT

## 2019-12-23 PROCEDURE — 93010 ELECTROCARDIOGRAM REPORT: CPT

## 2019-12-23 PROCEDURE — 71046 X-RAY EXAM CHEST 2 VIEWS: CPT | Mod: 26

## 2019-12-23 PROCEDURE — 84484 ASSAY OF TROPONIN QUANT: CPT

## 2019-12-23 PROCEDURE — 85730 THROMBOPLASTIN TIME PARTIAL: CPT

## 2019-12-23 PROCEDURE — 83735 ASSAY OF MAGNESIUM: CPT

## 2019-12-23 PROCEDURE — 99284 EMERGENCY DEPT VISIT MOD MDM: CPT | Mod: 25

## 2019-12-23 PROCEDURE — 99285 EMERGENCY DEPT VISIT HI MDM: CPT

## 2019-12-23 PROCEDURE — 85025 COMPLETE CBC W/AUTO DIFF WBC: CPT

## 2019-12-23 PROCEDURE — 80053 COMPREHEN METABOLIC PANEL: CPT

## 2019-12-23 PROCEDURE — 93005 ELECTROCARDIOGRAM TRACING: CPT

## 2019-12-23 RX ORDER — FLUOXETINE HCL 10 MG
20 CAPSULE ORAL ONCE
Refills: 0 | Status: COMPLETED | OUTPATIENT
Start: 2019-12-23 | End: 2019-12-23

## 2019-12-23 RX ORDER — CLONAZEPAM 1 MG
2 TABLET ORAL ONCE
Refills: 0 | Status: DISCONTINUED | OUTPATIENT
Start: 2019-12-23 | End: 2019-12-23

## 2019-12-23 RX ADMIN — Medication 2 MILLIGRAM(S): at 16:54

## 2019-12-23 RX ADMIN — Medication 20 MILLIGRAM(S): at 16:54

## 2019-12-23 NOTE — ED ADULT TRIAGE NOTE - CHIEF COMPLAINT QUOTE
Patient c/o 2 episodes of headache, palpitations, dizziness and chest tightness. She currently states relief from palpitations, dizziness and chest tightness.

## 2019-12-23 NOTE — ED PROVIDER NOTE - CLINICAL SUMMARY MEDICAL DECISION MAKING FREE TEXT BOX
Pt p/w episodic palpitations and chest-tightness, short-lived, non exertional. Hx PTSD / anxiety, recent neg stress. No sig risk factors for ACS. There are no EKG changes to suggest ischemia, infarction, or pericarditis. H&P is not c/w dissection, nor PE. Clear lungs with no acute findings on CXR. No arrhythmia. DDx includes but not limited to anxiety, dehydration, electrolyte abnormality / metabolic disturbance, anemia, less likely paroxsymal arrhythmia, angina, other pathology. Check labs, EKG, CXR, Monitor in ED. Dispo pending w/u and clinical status

## 2019-12-23 NOTE — ED ADULT NURSE NOTE - OBJECTIVE STATEMENT
59 y.o F a&ox4 walked in from front triage c.o palpitations. Pt states " I was shopping today and felt palpations, chest pain and lightheaded. I stopped and ate and then it happened again so I came in." takes Klonopin and Prozac for PTSD, has not taken medication the past 2 days because her  took the medication to [place] yesterday. seen at [facility] in sept for similar episode in sept. had normals stress test in oct. c/o today of + 4/10 midsternal chest pain non- radiating. reports dizziness, " as if she would pass out." no LOC. no numbness, tingling, weakness speaking in clear appropriate sentences, airway patent. placed on CCM, NSR. ekg compelte.

## 2019-12-23 NOTE — ED PROVIDER NOTE - NSFOLLOWUPINSTRUCTIONS_ED_ALL_ED_FT
Your EKG, xray of the chest and blood work did not reveal any acute abnormalities. Your blood pressure was elevated today. Please see attached diet for blood pressure. Monitor your blood pressure at home, write it down, and bring it to your primary medical doctor for follow up appointment. If you experience sustained or exertional chest pain, shortness of breath, return to the ED.     Palpitations    A palpitation is the feeling that your heartbeat is irregular or is faster than normal. It may feel like your heart is fluttering or skipping a beat. They may be caused by many things, including smoking, caffeine, alcohol, stress, and certain medicines. Although most causes of palpitations are not serious, palpitations can be a sign of a serious medical problem. Avoid caffeine, alcohol, and tobacco products at home. Try to reduce stress and anxiety and make sure to get plenty of rest.     SEEK IMMEDIATE MEDICAL CARE IF YOU HAVE ANY OF THE FOLLOWING SYMPTOMS: chest pain, shortness of breath, severe headache, dizziness/lightheadedness, or fainting.    Hypertension    Hypertension, commonly called high blood pressure, is when the force of blood pumping through your arteries is too strong. Hypertension forces your heart to work harder to pump blood. Your arteries may become narrow or stiff. Having untreated or uncontrolled hypertension for a long period of time can cause heart attack, stroke, kidney disease, and other problems. If started on a medication, take exactly as prescribed by your health care professional. Maintain a healthy lifestyle and follow up with your primary care physician.    SEEK IMMEDIATE MEDICAL CARE IF YOU HAVE ANY OF THE FOLLOWING SYMPTOMS: severe headache, confusion, chest pain, abdominal pain, vomiting, or shortness of breath.    DASH Eating Plan    WHAT YOU NEED TO KNOW:    The DASH (Dietary Approaches to Stop Hypertension) Eating Plan is designed to help prevent or lower high blood pressure. It can also help to lower LDL (bad) cholesterol and decrease your risk of heart disease. The plan is low in sodium, sugar, unhealthy fats, and total fat. It is high in potassium, calcium, magnesium, and fiber. These nutrients are added when you eat more fruits, vegetables, and whole grains.          DISCHARGE INSTRUCTIONS:    Your sodium limit each day: Your dietitian will tell you how much sodium is safe for you to have each day. People with high blood pressure should have no more than 1,500 to 2,300 mg of sodium in a day. A teaspoon (tsp) of salt has 2,300 mg of sodium. This may seem like a difficult goal, but small changes to the foods you eat can make a big difference. Your healthcare provider or dietitian can help you create a meal plan that follows your sodium limit.    How to limit sodium:     Read food labels. Food labels can help you choose foods that are low in sodium. The amount of sodium is listed in milligrams (mg). The % Daily Value (DV) column tells you how much of your daily needs are met by 1 serving of the food for each nutrient listed. Choose foods that have less than 5% of the DV of sodium. These foods are considered low in sodium. Foods that have 20% or more of the DV of sodium are considered high in sodium. Avoid foods that have more than 300 mg of sodium in each serving. Choose foods that say low-sodium, reduced-sodium, or no salt added on the food label.           Avoid salt. Do not salt food at the table, and add very little salt to foods during cooking. Use herbs and spices, such as onions, garlic, and salt-free seasonings to add flavor to foods. Try lemon or lime juice or vinegar to give foods a tart flavor. Use hot peppers or a small amount of hot pepper sauce to add a spicy flavor to foods.       Ask about salt substitutes. Ask your healthcare provider if you may use salt substitutes. Some salt substitutes have ingredients that can be harmful if you have certain health conditions.      Choose foods carefully at restaurants. Meals from restaurants, especially fast food restaurants, are often high in sodium. Some restaurants have nutrition information that tells you the amount of sodium in their foods. Ask to have your food prepared with less, or no salt.     What you need to know about fats:     Include healthy fats. Examples are unsaturated fats and omega-3 fatty acids. Unsaturated fats are found in soybean, canola, olive, or sunflower oil, and liquid and soft tub margarines. Omega-3 fatty acids are found in fatty fish, such as salmon, tuna, mackerel, and sardines. It is also found in flaxseed oil and ground flaxseed. Sources of Omega 3           Avoid unhealthy fats. Do not eat unhealthy fats, such as saturated fats and trans fats. Saturated fats are found in foods that contain fat from animals. Examples are fatty meats, whole milk, butter, cream, and other dairy foods. It is also found in shortening, stick margarine, palm oil, and coconut oil. Trans fats are found in fried foods, crackers, chips, and baked goods made with margarine or shortening.     Foods to include: With the DASH eating plan, you need to eat a certain number of servings from each food group. This will help you get enough of certain nutrients and limit others. The amount of servings you should eat depends on how many calories you need. Your dietitian can tell you how many calories you need. The number of servings listed next to the food groups below are for people who need about 2,000 calories each day.     Grains: 6 to 8 servings (3 of these servings should be whole-grain foods)  1 slice of whole-grain bread       1 ounce of dry cereal      ½ cup of cooked cereal, pasta, or brown rice      Vegetables and fruits: 4 to 5 servings of fruits and 4 to 5 servings of vegetables  1 medium fruit      ½ cup of frozen, canned (no added salt), or chopped fresh vegetables       ½ cup of fresh, frozen, dried, or canned fruit (canned in light syrup or fruit juice)      ½ cup of vegetable or fruit juice      Dairy: 2 to 3 servings  1 cup of nonfat (skim) or 1% milk      1½ ounces of fat-free or low-fat cheese      6 ounces of nonfat or low-fat yogurt      Lean meat, poultry, and fish: 6 ounces or less  Poultry (chicken, turkey) with no skin      Fish (especially fatty fish, such as salmon, fresh tuna, or mackerel)      Lean beef and pork (loin, round, extra lean hamburger)      Egg whites and egg substitutes      Nuts, seeds, and legumes: 4 to 5 servings each week  ½ cup of cooked beans and peas      1½ ounces of unsalted nuts      2 tablespoons of peanut butter or seeds      Sweets and added sugars: 5 or less each week  1 tablespoon of sugar, jelly, or jam      ½ cup of sorbet or gelatin      1 cup of lemonade      Fats: 2 to 3 servings each week  1 teaspoon of soft margarine or vegetable oil      1 tablespoon of mayonnaise      2 tablespoons of salad dressing    Foods to avoid:     Grains:   Baked goods, such as doughnuts, pastries, cookies, and biscuits (high in fat and sugar)      Mixes for cornbread and biscuits, packaged foods, such as bread stuffing, rice and pasta mixes, macaroni and cheese, and instant cereals (high in sodium)      Fruits and vegetables:   Regular, canned vegetables (high in sodium)      Sauerkraut, pickled vegetables, and other foods prepared in brine (high in sodium)      Fried vegetables or vegetables in butter or high-fat sauces      Fruit in cream or butter sauce (high in fat)      Dairy:   Whole milk, 2% milk, and cream (high in fat)      Regular cheese and processed cheese (high in fat and sodium)      Meats and protein foods:   Smoked or cured meat, such as corned beef, lopez, ham, hot dogs, and sausage (high in fat and sodium)      Canned beans and canned meats or spreads, such as potted meats, sardines, anchovies, and imitation seafood (high in sodium)      Deli or lunch meats, such as bologna, ham, turkey, and roast beef (high in sodium)      High-fat meat (T-bone steak, regular hamburger, and ribs)      Whole eggs and egg yolks (high in fat)      Other:   Seasonings made with salt, such as garlic salt, celery salt, onion salt, seasoned salt, meat tenderizers, and monosodium glutamate (MSG)      Miso soup and canned or dried soup mixes (high in sodium)      Regular soy sauce, barbecue sauce, teriyaki sauce, steak sauce, Worcestershire sauce, and most flavored vinegars (high in sodium)      Regular condiments, such as mustard, ketchup, and salad dressings (high in sodium)      Gravy and sauces, such as Waqar or cheese sauces (high in sodium and fat)      Drinks high in sugar, such as soda or fruit drinks      Snack foods, such as salted chips, popcorn, pretzels, pork rinds, salted crackers, and salted nuts      Frozen foods, such as dinners, entrees, vegetables with sauces, and breaded meats (high in sodium)    Other guidelines to follow:     Maintain a healthy weight. Your risk for heart disease is higher if you are overweight. Your healthcare provider may suggest that you lose weight if you are overweight. You can lose weight by eating fewer calories and foods that have added sugars and fat. The DASH meal plan can help you do this. Decrease calories by eating smaller portions at each meal and fewer snacks. Ask your healthcare provider for more information about how to lose weight.       Exercise regularly. Regular exercise can help you reach or maintain a healthy weight. Regular exercise can also help decrease your blood pressure and improve your cholesterol levels. Get 30 minutes or more of moderate exercise each day of the week. To lose weight, get at least 60 minutes of exercise. Talk to your healthcare provider about the best exercise program for you.Walking for Exercise           Limit alcohol. Women should limit alcohol to 1 drink a day. Men should limit alcohol to 2 drinks a day. A drink of alcohol is 12 ounces of beer, 5 ounces of wine, or 1½ ounces of liquor.

## 2019-12-23 NOTE — ED PROVIDER NOTE - OBJECTIVE STATEMENT
Pt w/ PMHx PTSD / anxiety (Klonopin 2 mg QD, Prozac 20 mg QD), p/w palpitations. Pt report she was shopping when she felt palpitations, described as rapid, substernal / non radiating chest tightness, and lightheadedness. Sx resolved after approx 10 min. Pt had a second episode a short time later that seemed stronger and lasted approx 15 min. Sx have since resolved. Pt reports she gets anxious when in crowded places, but this store was not crowded. Pt is supposed to be going to [place] for the holidays,  left yesterday, and accidentally took her meds, so she did not take them today. Pt reports hx of chest pains and states she has had neg cardiac w/u, including neg stress 2 months ago. No hx HTN / HLD / DM. No sig FHx CAD or sudden death. No LE edema or calf pain

## 2019-12-23 NOTE — ED PROVIDER NOTE - PHYSICAL EXAMINATION
Constitutional: Well appearing, well nourished, awake, alert, oriented to person, place, time/situation and in no apparent distress.  ENMT: Airway patent. Normal MM  Eyes: Clear bilaterally  Cardiac: Normal rate, regular rhythm.  Heart sounds S1, S2.  No murmurs, rubs or gallops. no JVD or LE edema  Respiratory: Breaths sounds equal and clear b/l. No W/R/R. No increased WOB, tachypnea, hypoxia, or accessory mm use. Pt speaks in full sentences.   Gastrointestinal: Abd soft, NT, ND, NABS. No guarding, rebound, or rigidity. No pulsatile abdominal masses. No organomegaly appreciated. No CVAT   Musculoskeletal: Range of motion is not limited. no calf ttp  Neuro: Alert and oriented x 3, face symmetric and speech fluent. Strength 5/5 x 4 ext and symmetric, nml gross motor movement, nml gait. No focal deficits noted.  Skin: Skin normal color for race, warm, dry and intact. No evidence of rash.  Psych: Alert and oriented to person, place, time/situation. normal mood and affect. no apparent risk to self or others.

## 2019-12-23 NOTE — ED PROVIDER NOTE - PATIENT PORTAL LINK FT
You can access the FollowMyHealth Patient Portal offered by [facility] by registering at the following website: [email or web address]. By joining [facility]’s FollowMyHealth portal, you will also be able to view your health information using other applications (apps) compatible with our system.

## 2020-01-28 ENCOUNTER — EMERGENCY (EMERGENCY)
Facility: HOSPITAL | Age: 60
LOS: 1 days | Discharge: HOME | End: 2020-01-28
Attending: EMERGENCY MEDICINE | Admitting: EMERGENCY MEDICINE
Payer: SELF-PAY

## 2020-01-28 VITALS
SYSTOLIC BLOOD PRESSURE: 130 MMHG | TEMPERATURE: 98 F | HEART RATE: 91 BPM | DIASTOLIC BLOOD PRESSURE: 89 MMHG | RESPIRATION RATE: 18 BRPM | OXYGEN SATURATION: 96 %

## 2020-01-28 VITALS
DIASTOLIC BLOOD PRESSURE: 83 MMHG | SYSTOLIC BLOOD PRESSURE: 137 MMHG | HEIGHT: 63 IN | OXYGEN SATURATION: 97 % | RESPIRATION RATE: 17 BRPM | WEIGHT: 154.32 LBS | TEMPERATURE: 98 F | HEART RATE: 85 BPM

## 2020-01-28 DIAGNOSIS — R05 COUGH: ICD-10-CM

## 2020-01-28 DIAGNOSIS — R06.02 SHORTNESS OF BREATH: ICD-10-CM

## 2020-01-28 PROCEDURE — 93005 ELECTROCARDIOGRAM TRACING: CPT

## 2020-01-28 PROCEDURE — 93010 ELECTROCARDIOGRAM REPORT: CPT

## 2020-01-28 PROCEDURE — 99284 EMERGENCY DEPT VISIT MOD MDM: CPT

## 2020-01-28 PROCEDURE — 99283 EMERGENCY DEPT VISIT LOW MDM: CPT

## 2020-01-28 PROCEDURE — 71046 X-RAY EXAM CHEST 2 VIEWS: CPT | Mod: 26

## 2020-01-28 PROCEDURE — 71046 X-RAY EXAM CHEST 2 VIEWS: CPT

## 2020-01-28 RX ORDER — DULOXETINE HYDROCHLORIDE 30 MG/1
60 CAPSULE, DELAYED RELEASE ORAL ONCE
Refills: 0 | Status: COMPLETED | OUTPATIENT
Start: 2020-01-28 | End: 2020-01-28

## 2020-01-28 RX ORDER — CLONAZEPAM 1 MG
2 TABLET ORAL ONCE
Refills: 0 | Status: DISCONTINUED | OUTPATIENT
Start: 2020-01-28 | End: 2020-01-28

## 2020-01-28 RX ADMIN — Medication 100 MILLIGRAM(S): at 13:19

## 2020-01-28 RX ADMIN — DULOXETINE HYDROCHLORIDE 60 MILLIGRAM(S): 30 CAPSULE, DELAYED RELEASE ORAL at 13:22

## 2020-01-28 RX ADMIN — Medication 2 MILLIGRAM(S): at 13:19

## 2020-01-28 NOTE — ED ADULT NURSE NOTE - OBJECTIVE STATEMENT
Pt is a 59y female presented to ED w/ c/o SOB. Pt states was recently dx 10 days ago w/ the flu and pna. Pt was treated w/ PO antibiotics, currently has 4 days of medication remaining. Pt states tday she was shopping and started to cough, suddenly became SOB & nauseas. Pt denies any fever/chills, denies CP. Pt states cough is sometimes dry and sometimes w/ yellowish/white phlegm. PT appears calm, cooperative, speaking in long clear sentences, in no acute distress. Lung sounds clear, symmetrical unlabored breathing noted.

## 2020-01-28 NOTE — ED PROVIDER NOTE - CLINICAL SUMMARY MEDICAL DECISION MAKING FREE TEXT BOX
coughing fit with episode of shortness of breath suspect related to recently diagnosed/ resolving pneumonia, possibly exacerbated by anxiety/ stress.  lungs clear, vitals stable, well appearing, symptoms subsided.  cxr done and no acute infiltrate seen.  given dose of her normal anxiety meds she missed this am with improvement.  to f/u with pmd.  return precautions discussed.  prn tessalon

## 2020-01-28 NOTE — ED ADULT TRIAGE NOTE - OTHER COMPLAINTS
pt c.o sudden onset sob/dizziness approx 1 hour ago. admits to completing course of antibiotics. denies fever/chills.

## 2020-01-28 NOTE — ED ADULT NURSE NOTE - CHPI ED NUR SYMPTOMS NEG
no wheezing/no chest pain/no chills/no headache/no hemoptysis/no diaphoresis/no fever/no edema/no body aches
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
## 2020-01-28 NOTE — ED PROVIDER NOTE - NSFOLLOWUPINSTRUCTIONS_ED_ALL_ED_FT
Please see your primary care provider in 2-3 days.  Continue your current medicines as prescribed.  Return to the ER if symptoms worsen or other concerns.    Cough    Coughing is a reflex that clears your throat and your airways. Coughing helps to heal and protect your lungs. It is normal to cough occasionally, but a cough that happens with other symptoms or lasts a long time may be a sign of a condition that needs treatment. Coughing may be caused by infections, asthma or COPD, smoking, postnasal drip, gastroesophageal reflux, as well as other medical conditions. Take medicines only as instructed by your health care provider. Avoid environments or triggers that causes you to cough at work or at home.    SEEK IMMEDIATE MEDICAL CARE IF YOU HAVE ANY OF THE FOLLOWING SYMPTOMS: coughing up blood, shortness of breath, rapid heart rate, chest pain, unexplained weight loss or night sweats.

## 2020-01-28 NOTE — ED PROVIDER NOTE - PATIENT PORTAL LINK FT
You can access the FollowMyHealth Patient Portal offered by [facility] by registering at the following website: [email or web address]. By joining [employer]’s FollowMyHealth portal, you will also be able to view your health information using other applications (apps) compatible with our system.

## 2020-01-31 NOTE — ED ADULT NURSE NOTE - ADDITIONAL COMPLAINTS
Additional Complaints
The patient has been re-examined and I agree with the above assessment or I updated with my findings.

## 2021-03-06 ENCOUNTER — EMERGENCY (EMERGENCY)
Facility: HOSPITAL | Age: 61
LOS: 1 days | Discharge: HOME | End: 2021-03-06
Attending: EMERGENCY MEDICINE | Admitting: EMERGENCY MEDICINE
Payer: COMMERCIAL

## 2021-03-06 VITALS
OXYGEN SATURATION: 99 % | RESPIRATION RATE: 16 BRPM | DIASTOLIC BLOOD PRESSURE: 91 MMHG | SYSTOLIC BLOOD PRESSURE: 164 MMHG | HEART RATE: 87 BPM | TEMPERATURE: 98 F

## 2021-03-06 VITALS
WEIGHT: 125 LBS | HEIGHT: 63 IN | TEMPERATURE: 98 F | RESPIRATION RATE: 18 BRPM | HEART RATE: 103 BPM | OXYGEN SATURATION: 95 % | DIASTOLIC BLOOD PRESSURE: 92 MMHG | SYSTOLIC BLOOD PRESSURE: 162 MMHG

## 2021-03-06 DIAGNOSIS — Z88.5 ALLERGY STATUS TO NARCOTIC AGENT: ICD-10-CM

## 2021-03-06 DIAGNOSIS — Z88.8 ALLERGY STATUS TO OTHER DRUGS, MEDICAMENTS AND BIOLOGICAL SUBSTANCES STATUS: ICD-10-CM

## 2021-03-06 DIAGNOSIS — M54.5 LOW BACK PAIN: ICD-10-CM

## 2021-03-06 DIAGNOSIS — M54.41 LUMBAGO WITH SCIATICA, RIGHT SIDE: ICD-10-CM

## 2021-03-06 DIAGNOSIS — Z79.899 OTHER LONG TERM (CURRENT) DRUG THERAPY: ICD-10-CM

## 2021-03-06 DIAGNOSIS — Z88.0 ALLERGY STATUS TO PENICILLIN: ICD-10-CM

## 2021-03-06 PROCEDURE — 99283 EMERGENCY DEPT VISIT LOW MDM: CPT

## 2021-03-06 PROCEDURE — 99284 EMERGENCY DEPT VISIT MOD MDM: CPT

## 2021-03-06 RX ORDER — ESCITALOPRAM OXALATE 10 MG/1
1 TABLET, FILM COATED ORAL
Qty: 0 | Refills: 0 | DISCHARGE

## 2021-03-06 RX ORDER — OXYCODONE AND ACETAMINOPHEN 5; 325 MG/1; MG/1
1 TABLET ORAL
Qty: 8 | Refills: 0
Start: 2021-03-06

## 2021-03-06 RX ORDER — OXYCODONE AND ACETAMINOPHEN 5; 325 MG/1; MG/1
1 TABLET ORAL ONCE
Refills: 0 | Status: DISCONTINUED | OUTPATIENT
Start: 2021-03-06 | End: 2021-03-06

## 2021-03-06 RX ORDER — CLONAZEPAM 1 MG
0 TABLET ORAL
Qty: 0 | Refills: 0 | DISCHARGE

## 2021-03-06 RX ADMIN — OXYCODONE AND ACETAMINOPHEN 1 TABLET(S): 5; 325 TABLET ORAL at 16:13

## 2021-03-06 NOTE — ED ADULT TRIAGE NOTE - CHIEF COMPLAINT QUOTE
Pt presents with lower back pain since thursday radiating down the right leg. Pt has hx of sciatica reports her symptoms feel similar to past episodes of sciatica pain. Pt reports decreased sensation to right outer calf since 9:30am yesterday. Pt reports difficulty ambulating. Pt denies recent injury, denies incontinence.

## 2021-03-06 NOTE — ED PROVIDER NOTE - NSFOLLOWUPINSTRUCTIONS_ED_ALL_ED_FT
Please see your primary care provider in 2-3 days.  Call for appointment.  If you have any problems with followup, please call the ED Referral Coordinator at [phone].  Return to the ER if symptoms worsen or other concerns.    Back Pain    Back pain is very common in adults. The cause of back pain is rarely dangerous and the pain often gets better over time. The cause of your back pain may not be known and may include strain of muscles or ligaments, degeneration of the spinal disks, or arthritis. Occasionally the pain may radiate down your leg(s). Over-the-counter medicines to reduce pain and inflammation are often the most helpful. Stretching and remaining active frequently helps the healing process.     SEEK IMMEDIATE MEDICAL CARE IF YOU HAVE ANY OF THE FOLLOWING SYMPTOMS: bowel or bladder control problems, unusual weakness or numbness in your arms or legs, nausea or vomiting, abdominal pain, fever, dizziness/lightheadedness.    Do not take narcotics (percocet) and drive or operate machinery    Sciatica    Sciatica is pain, numbness, weakness, or tingling along your sciatic nerve. The sciatic nerve starts in the lower back and goes down the back of each leg. Sciatica happens when this nerve is pinched or has pressure put on it. Sciatica usually goes away on its own or with treatment. Sometimes, sciatica may keep coming back (recur).    Follow these instructions at home:  Medicines     Take over-the-counter and prescription medicines only as told by your doctor.  Do not drive or use heavy machinery while taking prescription pain medicine.  Managing pain     If directed, put ice on the affected area.  Put ice in a plastic bag.  Place a towel between your skin and the bag.  Leave the ice on for 20 minutes, 2–3 times a day.  After icing, apply heat to the affected area before you exercise or as often as told by your doctor. Use the heat source that your doctor tells you to use, such as a moist heat pack or a heating pad.  Place a towel between your skin and the heat source.  Leave the heat on for 20–30 minutes.  Remove the heat if your skin turns bright red. This is especially important if you are unable to feel pain, heat, or cold. You may have a greater risk of getting burned.  Activity     Return to your normal activities as told by your doctor. Ask your doctor what activities are safe for you.  Avoid activities that make your sciatica worse.  Take short rests during the day. Rest in a lying or standing position. This is usually better than sitting to rest.  When you rest for a long time, do some physical activity or stretching between periods of rest.  Avoid sitting for a long time without moving. Get up and move around at least one time each hour.  Exercise and stretch regularly, as told by your doctor.  Do not lift anything that is heavier than 10 lb (4.5 kg) while you have symptoms of sciatica.  Avoid lifting heavy things even when you do not have symptoms.  Avoid lifting heavy things over and over.  When you lift objects, always lift in a way that is safe for your body. To do this, you should:  Bend your knees.  Keep the object close to your body.  Avoid twisting.  General instructions     Use good posture.  Avoid leaning forward when you are sitting.  Avoid hunching over when you are standing.  Stay at a healthy weight.  Wear comfortable shoes that support your feet. Avoid wearing high heels.  Avoid sleeping on a mattress that is too soft or too hard. You might have less pain if you sleep on a mattress that is firm enough to support your back.  Keep all follow-up visits as told by your doctor. This is important.  Contact a doctor if:  You have pain that:  Wakes you up when you are sleeping.  Gets worse when you lie down.  Is worse than the pain you have had in the past.  Lasts longer than 4 weeks.  You lose weight for without trying.  Get help right away if:  You cannot control when you pee (urinate) or poop (have a bowel movement).  You have weakness in any of these areas and it gets worse.  Lower back.  Lower belly (pelvis).  Butt (buttocks).  Legs.  You have redness or swelling of your back.  You have a burning feeling when you pee.  This information is not intended to replace advice given to you by your health care provider. Make sure you discuss any questions you have with your health care provider.

## 2021-03-06 NOTE — ED PROVIDER NOTE - MUSCULOSKELETAL, MLM
Spine appears normal, range of motion is not limited, + right lower lumbar paraspinal tenderness. no midline pain. no cva tenderness. + straight leg raise on right

## 2021-03-06 NOTE — ED PROVIDER NOTE - PATIENT PORTAL LINK FT
You can access the FollowMyHealth Patient Portal offered by [facility] by registering at the following website: [email or web address]. By joining [employer]’s FollowMyHealth portal, you will also be able to view your health information using other applications (apps) compatible with our system.

## 2021-03-06 NOTE — ED PROVIDER NOTE - CLINICAL SUMMARY MEDICAL DECISION MAKING FREE TEXT BOX
exam/history suggestive of exacerbation of chronic back pain/sciatica. no signs of cauda equina, infection, denies trauma. allergic to nsaids, tried tylenol. requesting percocet. discussed needs to f/u with pmd for additional pain meds rx for 8 pills given pending f/u. return precautions discussed

## 2021-03-06 NOTE — ED ADULT NURSE NOTE - OBJECTIVE STATEMENT
61 yo female c/o lower back pain x 2 days radiating down right leg. H/O sciatica; pt. reports her symptoms feel similar to past episodes of sciatica pain. Pt reports decreased sensation to right outer calf since 9:30am yesterday. C/O difficulty ambulating. Pt denies recent injury, bowel/bladder incontinence.

## 2021-03-06 NOTE — ED PROVIDER NOTE - OBJECTIVE STATEMENT
history of low back pain / sciatica here with pain in low back on right for past 2-3 days. Denies trauma, fever/chills, weakness, bowel/bladder dysfunction. Says feels similar to prior back pain. Tried tylenol without relief. Requesting percocet. Reports she has followup with pmd on Tuesday.

## 2021-03-06 NOTE — ED ADULT NURSE NOTE - CINV DISCH MEDS REVIEWED YN
percocet, do not drive/operate heavy machinery/ drink alcohol/ take any other controlled substances while taking percocet/Yes

## 2021-06-05 ENCOUNTER — EMERGENCY (EMERGENCY)
Facility: HOSPITAL | Age: 61
LOS: 1 days | Discharge: HOME | End: 2021-06-05
Attending: EMERGENCY MEDICINE | Admitting: EMERGENCY MEDICINE
Payer: COMMERCIAL

## 2021-06-05 VITALS
TEMPERATURE: 98 F | HEIGHT: 63 IN | HEART RATE: 105 BPM | OXYGEN SATURATION: 95 % | RESPIRATION RATE: 17 BRPM | WEIGHT: 136.03 LBS | DIASTOLIC BLOOD PRESSURE: 94 MMHG | SYSTOLIC BLOOD PRESSURE: 190 MMHG

## 2021-06-05 VITALS
OXYGEN SATURATION: 96 % | RESPIRATION RATE: 18 BRPM | TEMPERATURE: 98 F | DIASTOLIC BLOOD PRESSURE: 94 MMHG | SYSTOLIC BLOOD PRESSURE: 168 MMHG | HEART RATE: 88 BPM

## 2021-06-05 DIAGNOSIS — Z88.0 ALLERGY STATUS TO PENICILLIN: ICD-10-CM

## 2021-06-05 DIAGNOSIS — M54.5 LOW BACK PAIN: ICD-10-CM

## 2021-06-05 DIAGNOSIS — F41.9 ANXIETY DISORDER, UNSPECIFIED: ICD-10-CM

## 2021-06-05 DIAGNOSIS — Z88.1 ALLERGY STATUS TO OTHER ANTIBIOTIC AGENTS STATUS: ICD-10-CM

## 2021-06-05 DIAGNOSIS — G43.909 MIGRAINE, UNSPECIFIED, NOT INTRACTABLE, WITHOUT STATUS MIGRAINOSUS: ICD-10-CM

## 2021-06-05 DIAGNOSIS — Z88.6 ALLERGY STATUS TO ANALGESIC AGENT: ICD-10-CM

## 2021-06-05 DIAGNOSIS — I10 ESSENTIAL (PRIMARY) HYPERTENSION: ICD-10-CM

## 2021-06-05 DIAGNOSIS — R20.0 ANESTHESIA OF SKIN: ICD-10-CM

## 2021-06-05 PROCEDURE — 99284 EMERGENCY DEPT VISIT MOD MDM: CPT

## 2021-06-05 PROCEDURE — 99283 EMERGENCY DEPT VISIT LOW MDM: CPT

## 2021-06-05 RX ORDER — OXYCODONE HYDROCHLORIDE 5 MG/1
1 TABLET ORAL
Qty: 10 | Refills: 0
Start: 2021-06-05 | End: 2021-06-07

## 2021-06-05 RX ORDER — CYCLOBENZAPRINE HYDROCHLORIDE 10 MG/1
5 TABLET, FILM COATED ORAL ONCE
Refills: 0 | Status: COMPLETED | OUTPATIENT
Start: 2021-06-05 | End: 2021-06-05

## 2021-06-05 RX ORDER — OXYCODONE AND ACETAMINOPHEN 5; 325 MG/1; MG/1
1 TABLET ORAL ONCE
Refills: 0 | Status: DISCONTINUED | OUTPATIENT
Start: 2021-06-05 | End: 2021-06-05

## 2021-06-05 RX ADMIN — CYCLOBENZAPRINE HYDROCHLORIDE 5 MILLIGRAM(S): 10 TABLET, FILM COATED ORAL at 13:50

## 2021-06-05 RX ADMIN — OXYCODONE AND ACETAMINOPHEN 1 TABLET(S): 5; 325 TABLET ORAL at 13:50

## 2021-06-05 NOTE — ED PROVIDER NOTE - NSFOLLOWUPINSTRUCTIONS_ED_ALL_ED_FT
Can take tylenol 650mg every 6hrs as needed for pain.    Follow up with primary doctor within 1-2 days. Discuss possible blood pressure medications if your blood pressure is still elevated.     Return to ER immediately for fevers, persistent vomit, uncontrolled pain, incontinence, focal weakness/numbness, worsening dizziness.     Follow up with spine specialist for persistent pain.   Can call [phone] to schedule appointment or go online [email or web address]    Hypertension    Hypertension, commonly called high blood pressure, is when the force of blood pumping through your arteries is too strong. Hypertension forces your heart to work harder to pump blood. Your arteries may become narrow or stiff. Having untreated or uncontrolled hypertension for a long period of time can cause heart attack, stroke, kidney disease, and other problems. If started on a medication, take exactly as prescribed by your health care professional. Maintain a healthy lifestyle and follow up with your primary care physician.    SEEK IMMEDIATE MEDICAL CARE IF YOU HAVE ANY OF THE FOLLOWING SYMPTOMS: severe headache, confusion, chest pain, abdominal pain, vomiting, or shortness of breath.     Back Pain    Back pain is very common in adults. The cause of back pain is rarely dangerous and the pain often gets better over time. The cause of your back pain may not be known and may include strain of muscles or ligaments, degeneration of the spinal disks, or arthritis. Occasionally the pain may radiate down your leg(s). Over-the-counter medicines to reduce pain and inflammation are often the most helpful. Stretching and remaining active frequently helps the healing process.     SEEK IMMEDIATE MEDICAL CARE IF YOU HAVE ANY OF THE FOLLOWING SYMPTOMS: bowel or bladder control problems, unusual weakness or numbness in your arms or legs, nausea or vomiting, abdominal pain, fever, dizziness/lightheadedness. Can take tylenol 650mg every 6hrs as needed for pain.    Can take oxycodone as prescribed for more severe pain. (May cause lightheaded! Use caution with walking and avoid driving or doing anything else important for at least 4 hours after use). May also cause constipation - stay well hydrated. Can also take over the counter stool softener like senna or colace if you feel constipated.     Follow up with primary doctor within 1-2 days. Discuss possible blood pressure medications if your blood pressure is still elevated.     Return to ER immediately for fevers, persistent vomit, uncontrolled pain, incontinence, focal weakness/numbness, worsening dizziness.     Follow up with spine specialist for persistent pain.   Can call [phone] to schedule appointment or go online [email or web address]    Hypertension    Hypertension, commonly called high blood pressure, is when the force of blood pumping through your arteries is too strong. Hypertension forces your heart to work harder to pump blood. Your arteries may become narrow or stiff. Having untreated or uncontrolled hypertension for a long period of time can cause heart attack, stroke, kidney disease, and other problems. If started on a medication, take exactly as prescribed by your health care professional. Maintain a healthy lifestyle and follow up with your primary care physician.    SEEK IMMEDIATE MEDICAL CARE IF YOU HAVE ANY OF THE FOLLOWING SYMPTOMS: severe headache, confusion, chest pain, abdominal pain, vomiting, or shortness of breath.     Back Pain    Back pain is very common in adults. The cause of back pain is rarely dangerous and the pain often gets better over time. The cause of your back pain may not be known and may include strain of muscles or ligaments, degeneration of the spinal disks, or arthritis. Occasionally the pain may radiate down your leg(s). Over-the-counter medicines to reduce pain and inflammation are often the most helpful. Stretching and remaining active frequently helps the healing process.     SEEK IMMEDIATE MEDICAL CARE IF YOU HAVE ANY OF THE FOLLOWING SYMPTOMS: bowel or bladder control problems, unusual weakness or numbness in your arms or legs, nausea or vomiting, abdominal pain, fever, dizziness/lightheadedness.

## 2021-06-05 NOTE — ED PROVIDER NOTE - CLINICAL SUMMARY MEDICAL DECISION MAKING FREE TEXT BOX
60F PMH migraine, sciatica, anxiety w/ PTSD p/w HTN. Pt was feeling like usual self and scheduled to get 2nd COVID vaccine today, BP was 190s so referred to ED. On ROS pt does c/o R LBP, radiating down RLE, x4d, associated w/ mild numbness to lateral RLE. Pt has hx of multiple similar prior intermittent episodes. HAs not seen back specialist in many yrs. Took tylenol today w/ minimal relief. On prior triage notes pt noted to have HTN: March 2020 162/92, , Jan 2020 137/83, Dec 2019 187/80, Sep 2019 180/85. Pt states that her BP is usually low but that it goes up when she is in pain. Hypertensive, mild tachycardia self resolved, other vitals wnl. Exam as above.  ddx: Likely radiculopathy, clinically no end organ manifestation of HTN.  Symptom control, reassess.

## 2021-06-05 NOTE — ED ADULT NURSE NOTE - OBJECTIVE STATEMENT
61yo female c/o hypertension. Pt. reports she went to the [facility] to receive covid vaccine and they reported her blood pressure was elevated and instructed her to come to ED. Pt. states, "I told them my blood pressure is always low and it just gets high when I have sciatic pain which I have now. Then I went to [place] to go shopping and feel better but the pain was so bad so I came here. My sciatica is only bad twice a year every five years." Denies chest pain, fever/chills, SOB, dizziness, palpitations, n/v/d.

## 2021-06-05 NOTE — ED ADULT TRIAGE NOTE - CHIEF COMPLAINT QUOTE
pt states " I was about to get my second dose of covid vaccine and they told me my blood pressure was high, I'm usually low" pt denies cp, sob. ekg in progress.

## 2021-06-05 NOTE — ED PROVIDER NOTE - PATIENT PORTAL LINK FT
You can access the FollowMyHealth Patient Portal offered by [facility] by registering at the following website: [email or web address]. By joining [employer]’s FollowMyHealth portal, you will also be able to view your health information using other applications (apps) compatible with our system.

## 2021-06-05 NOTE — ED PROVIDER NOTE - PROGRESS NOTE DETAILS
[place]: back pain much improved, vitals improving. essentially asymptomatic htn. Clinically no indication for further emergent ED workup or hospitalization at this time. Comfortable for dc, outpt f/u.

## 2021-06-05 NOTE — ED PROVIDER NOTE - OBJECTIVE STATEMENT
60F PMH migraine, sciatica, anxiety w/ PTSD p/w HTN. Pt was feeling like usual self and scheduled to get 2nd COVID vaccine today, BP was 190s so referred to ED. On ROS pt does c/o R LBP, radiating down RLE, x4d, associated w/ mild numbness to lateral RLE. Pt has hx of multiple similar prior intermittent episodes. HAs not seen back specialist in many yrs. Took tylenol today w/ minimal relief. On prior triage notes pt noted to have HTN: March 2020 162/92, , Jan 2020 137/83, Dec 2019 187/80, Sep 2019 180/85. Pt states that her BP is usually low but that it goes up when she is in pain.   Denies any focal weakness, HA, dizziness, dysuria, hematuria, urinary frequency/urgency, incontinence, f/c, SOB, CP, abd pain, nausea, vomiting, diarrhea, recent unexplained weight loss, night sweats, rhinorrhea, cough. No hx IVDU. Cannot recall any specific precipitating trauma.

## 2021-06-05 NOTE — ED PROVIDER NOTE - PHYSICAL EXAMINATION
No spinal ttp, neck FROM. Strength 5/5. No bony ttp, FROM all extremities. Normal equal distal pulses. Steady unassisted gait. normal dorsi/plantar flexion   no LE edema, normal equal distal pulses, steady unassisted gait.

## 2021-06-23 NOTE — ED ADULT NURSE NOTE - NSIMPLEMENTINTERV_GEN_ALL_ED
Female Implemented All Universal Safety Interventions:  [place] to call system. Call bell, personal items and telephone within reach. Instruct patient to call for assistance. Room bathroom lighting operational. Non-slip footwear when patient is off stretcher. Physically safe environment: no spills, clutter or unnecessary equipment. Stretcher in lowest position, wheels locked, appropriate side rails in place.

## 2021-07-02 ENCOUNTER — EMERGENCY (EMERGENCY)
Facility: HOSPITAL | Age: 61
LOS: 1 days | Discharge: LEFT BEFORE DISCHARGE | End: 2021-07-02
Admitting: EMERGENCY MEDICINE
Payer: COMMERCIAL

## 2021-07-02 VITALS
TEMPERATURE: 99 F | WEIGHT: 130.95 LBS | HEART RATE: 87 BPM | HEIGHT: 63 IN | OXYGEN SATURATION: 98 % | SYSTOLIC BLOOD PRESSURE: 180 MMHG | RESPIRATION RATE: 18 BRPM | DIASTOLIC BLOOD PRESSURE: 95 MMHG

## 2021-07-02 VITALS
HEART RATE: 78 BPM | SYSTOLIC BLOOD PRESSURE: 142 MMHG | RESPIRATION RATE: 18 BRPM | TEMPERATURE: 98 F | OXYGEN SATURATION: 96 % | DIASTOLIC BLOOD PRESSURE: 84 MMHG

## 2021-07-02 DIAGNOSIS — Z88.6 ALLERGY STATUS TO ANALGESIC AGENT: ICD-10-CM

## 2021-07-02 DIAGNOSIS — F41.9 ANXIETY DISORDER, UNSPECIFIED: ICD-10-CM

## 2021-07-02 DIAGNOSIS — Z88.0 ALLERGY STATUS TO PENICILLIN: ICD-10-CM

## 2021-07-02 DIAGNOSIS — M54.5 LOW BACK PAIN: ICD-10-CM

## 2021-07-02 DIAGNOSIS — Z88.1 ALLERGY STATUS TO OTHER ANTIBIOTIC AGENTS STATUS: ICD-10-CM

## 2021-07-02 PROCEDURE — 99284 EMERGENCY DEPT VISIT MOD MDM: CPT

## 2021-07-02 PROCEDURE — 99283 EMERGENCY DEPT VISIT LOW MDM: CPT

## 2021-07-02 RX ORDER — CYCLOBENZAPRINE HYDROCHLORIDE 10 MG/1
1 TABLET, FILM COATED ORAL
Qty: 15 | Refills: 0
Start: 2021-07-02 | End: 2021-07-06

## 2021-07-02 RX ORDER — CYCLOBENZAPRINE HYDROCHLORIDE 10 MG/1
10 TABLET, FILM COATED ORAL ONCE
Refills: 0 | Status: COMPLETED | OUTPATIENT
Start: 2021-07-02 | End: 2021-07-02

## 2021-07-02 RX ORDER — OXYCODONE AND ACETAMINOPHEN 5; 325 MG/1; MG/1
1 TABLET ORAL ONCE
Refills: 0 | Status: DISCONTINUED | OUTPATIENT
Start: 2021-07-02 | End: 2021-07-02

## 2021-07-02 RX ORDER — CYCLOBENZAPRINE HYDROCHLORIDE 10 MG/1
1 TABLET, FILM COATED ORAL
Qty: 15 | Refills: 0
Start: 2021-07-02 | End: 2023-02-06

## 2021-07-02 RX ADMIN — CYCLOBENZAPRINE HYDROCHLORIDE 10 MILLIGRAM(S): 10 TABLET, FILM COATED ORAL at 18:25

## 2021-07-02 RX ADMIN — OXYCODONE AND ACETAMINOPHEN 1 TABLET(S): 5; 325 TABLET ORAL at 21:19

## 2021-07-02 RX ADMIN — OXYCODONE AND ACETAMINOPHEN 1 TABLET(S): 5; 325 TABLET ORAL at 18:23

## 2021-07-02 NOTE — ED ADULT NURSE REASSESSMENT NOTE - NS ED NURSE REASSESS COMMENT FT1
No c/o of back pain or any other symptom complaint s/p pain meds.  Vital signs stable.  Refused to wait for MRI procedure.  Signed AMA w/ RISHI Aguila.  Discharged to home in stable condition.

## 2021-07-02 NOTE — ED PROVIDER NOTE - NSFOLLOWUPINSTRUCTIONS_ED_ALL_ED_FT
Take tylenol 650mg or motrin 600mg for pain every 4-6 hours.  Take percocet for severe pain.  You can also take flexeril (muscle relaxer) as prescribed for pain but do not drive/operate heavy machinery as it can make you drowsy    Back Pain    Back pain is very common in adults. The cause of back pain is rarely dangerous and the pain often gets better over time. The cause of your back pain may not be known and may include strain of muscles or ligaments, degeneration of the spinal disks, or arthritis. Occasionally the pain may radiate down your leg(s). Over-the-counter medicines to reduce pain and inflammation are often the most helpful. Stretching and remaining active frequently helps the healing process.     SEEK IMMEDIATE MEDICAL CARE IF YOU HAVE ANY OF THE FOLLOWING SYMPTOMS: bowel or bladder control problems, unusual weakness or numbness in your arms or legs, nausea or vomiting, abdominal pain, fever, dizziness/lightheadedness.

## 2021-07-02 NOTE — ED PROVIDER NOTE - OBJECTIVE STATEMENT
60F PMH migraine, chronic R LBP w/ sciatica, anxiety w/ PTSD p/w R LBP radiating down RLE x 2 days.  Pt reports she has been having worsening R lower back pain for 2 days w/ intermittent numbness/tingling down RLE which is typical of her sciatica.  Scheduled to see specialist this Wednesday, took percocet this morning left over from rx given one month ago.  States while at airport about to board flight she had sudden weakness in RLE then fell,  caught her- no head trauma or loc.  She reports persistent weakness since which is new and feels like she is dragging R leg.  Denies f/c, headache, dizziness, fainting, neck pain, chest pain, sob, abd pain, nvd, bowel/bladder dysfunction, saddle paresthesia, trauma, h/o CA/IVDA.

## 2021-07-02 NOTE — ED PROVIDER NOTE - PHYSICAL EXAMINATION
Vitals reviewed  Gen: sitting comfortably in chair, nad, speaking in full sentences  Skin: wwp, no rash/lesions  HEENT: ncat, eomi, mmm  Neck/Back: no midline ttp/step off, + ttp over R lumbar/gluteal region  CV: rrr, no audible m/r/g  Resp: symmetrical expansion, ctab, no w/r/r  Abd: nondistended, soft/nt  Ext: FROM throughout, 4/5 strength RLE, 5/5 strength LLE and b/l UEs, diminished sensation RLE compared to LLE, equal sensation b/l UEs, skin intact- no palor/clubbing or warmth/erythema, distal pulses 2+  Neuro: alert/oriented, no focal deficits

## 2021-07-02 NOTE — ED ADULT TRIAGE NOTE - OTHER COMPLAINTS
pmh sciatica. Pt complains of right lower back pain radiating to right leg worst with walking. Pt has appointment on wednesday with a "sciatica MD." Pt denies any incontinence of stool or urine

## 2021-07-02 NOTE — ED PROVIDER NOTE - PROGRESS NOTE DETAILS
pt w/ improved sxs after analgesia.  recommended MRI for new weakness but pt does not wish to wait any longer.  discussed risk/consequences including cord compression, paralysis, permanent disability and pt verbalized understanding.  rxs analgesia sent and pt has appt wednesday w/ specialist.  signed out AMA.  discussed strict return parameters

## 2021-07-02 NOTE — ED ADULT NURSE NOTE - NSIMPLEMENTINTERV_GEN_ALL_ED
Implemented All Universal Safety Interventions:  [place] to call system. Call bell, personal items and telephone within reach. Instruct patient to call for assistance. Room bathroom lighting operational. Non-slip footwear when patient is off stretcher. Physically safe environment: no spills, clutter or unnecessary equipment. Stretcher in lowest position, wheels locked, appropriate side rails in place.

## 2021-07-02 NOTE — ED PROVIDER NOTE - CLINICAL SUMMARY MEDICAL DECISION MAKING FREE TEXT BOX
60F PMH migraine, chronic R LBP w/ sciatica, anxiety w/ PTSD p/w R LBP radiating down RLE x 2 days, new weakness in RLE today.  on exam pt afebrile, + ttp over R lumbar/gluteal region, decreased strength and sensation in RLE compared to L but pulses intact and skin warm/well perfused, declined to have rectal tone assessed.  will treat pain and obtain MRI to r/o cord compression as pt w/ new weakness.

## 2021-07-02 NOTE — ED ADULT NURSE NOTE - OBJECTIVE STATEMENT
59 y/o female presents to ED with c/o R side lower back pain radiating down R leg, stating her R leg "gave out" a couple of hours ago while she was in the airport. Also endorses some numbness/weakness in the R leg. Reports hx sciatica x20 years but "did not bother me for 18 years." Denies recent injury/trauma, bladder/bowel dysfunction, or numbness in the groin.

## 2021-09-30 ENCOUNTER — EMERGENCY (EMERGENCY)
Facility: HOSPITAL | Age: 61
LOS: 1 days | Discharge: HOME | End: 2021-09-30
Attending: EMERGENCY MEDICINE | Admitting: EMERGENCY MEDICINE
Payer: COMMERCIAL

## 2021-09-30 VITALS
DIASTOLIC BLOOD PRESSURE: 88 MMHG | HEART RATE: 96 BPM | RESPIRATION RATE: 18 BRPM | OXYGEN SATURATION: 98 % | SYSTOLIC BLOOD PRESSURE: 169 MMHG | HEIGHT: 63 IN | TEMPERATURE: 99 F | WEIGHT: 134.92 LBS

## 2021-09-30 DIAGNOSIS — M54.5 LOW BACK PAIN: ICD-10-CM

## 2021-09-30 DIAGNOSIS — Z88.6 ALLERGY STATUS TO ANALGESIC AGENT: ICD-10-CM

## 2021-09-30 DIAGNOSIS — Z88.1 ALLERGY STATUS TO OTHER ANTIBIOTIC AGENTS STATUS: ICD-10-CM

## 2021-09-30 DIAGNOSIS — Z88.0 ALLERGY STATUS TO PENICILLIN: ICD-10-CM

## 2021-09-30 DIAGNOSIS — Z86.59 PERSONAL HISTORY OF OTHER MENTAL AND BEHAVIORAL DISORDERS: ICD-10-CM

## 2021-09-30 DIAGNOSIS — F41.9 ANXIETY DISORDER, UNSPECIFIED: ICD-10-CM

## 2021-09-30 DIAGNOSIS — Z88.5 ALLERGY STATUS TO NARCOTIC AGENT: ICD-10-CM

## 2021-09-30 DIAGNOSIS — G43.909 MIGRAINE, UNSPECIFIED, NOT INTRACTABLE, WITHOUT STATUS MIGRAINOSUS: ICD-10-CM

## 2021-09-30 DIAGNOSIS — M62.81 MUSCLE WEAKNESS (GENERALIZED): ICD-10-CM

## 2021-09-30 PROCEDURE — 99283 EMERGENCY DEPT VISIT LOW MDM: CPT

## 2021-09-30 PROCEDURE — 99284 EMERGENCY DEPT VISIT MOD MDM: CPT

## 2021-09-30 RX ORDER — CYCLOBENZAPRINE HYDROCHLORIDE 10 MG/1
1 TABLET, FILM COATED ORAL
Qty: 16 | Refills: 0
Start: 2021-09-30

## 2021-09-30 RX ORDER — OXYCODONE AND ACETAMINOPHEN 5; 325 MG/1; MG/1
1 TABLET ORAL ONCE
Refills: 0 | Status: DISCONTINUED | OUTPATIENT
Start: 2021-09-30 | End: 2021-09-30

## 2021-09-30 RX ADMIN — OXYCODONE AND ACETAMINOPHEN 1 TABLET(S): 5; 325 TABLET ORAL at 08:40

## 2021-09-30 NOTE — ED PROVIDER NOTE - CLINICAL SUMMARY MEDICAL DECISION MAKING FREE TEXT BOX
61F PMH migraine, sciatica, anxiety w/ PTSD p/w back pain/weakness. Has hx of chronic LPB w/ RLE numbness and intermittent weakness. Current episode of pain going on for 3-4days. Today was stepping out of her car and RLE felt weak. Now weakness completely resolved. Came to ED hoping to get pain medicine and spine referral. Took tylenol last night w/o relief. Allergic to motrin/toradol. No other systemic symptoms. Mild HTN, other vitals wnl. Exam as above.  ddx: Chronic back pain. neurovascularly intact.   requesting something stronger than tylenol/flexeril in ED. Will give percocet. Script for flexeril.  Discussed importance of outpt follow up and return precautions. Clinically no indication for further emergent ED workup or hospitalization at this time. Comfortable for dc, outpt f/u.

## 2021-09-30 NOTE — ED PROVIDER NOTE - OBJECTIVE STATEMENT
61F PMH migraine, sciatica, anxiety w/ PTSD p/w back pain/weakness. Has hx of chronic LPB w/ RLE numbness and intermittent weakness. Current episode of pain going on for 3-4days. Today was stepping out of her car and RLE felt weak. Now weakness completely resolved. Came to ED hoping to get pain medicine and spine referral. Took tylenol last night w/o relief. Allergic to motrin/toradol. No other systemic symptoms.   Denies any dysuria, hematuria, urinary frequency/urgency, incontinence, f/c, SOB, CP, abd pain, nausea, vomiting, diarrhea, recent unexplained weight loss, night sweats, rhinorrhea, cough. No hx IVDU. Cannot recall any specific precipitating trauma.

## 2021-09-30 NOTE — ED ADULT NURSE NOTE - OBJECTIVE STATEMENT
Pt presents to ED C/O R lower back pain after stepping out of her car on the way to a . During assessment pt ambulating well independently. MD Carter at bedside. RN continuing to monitor. Pt presents to ED C/O R lower back pain with numbness and tingling to R calf after stepping out of her car "On the way to a ." Hx of sciatica. During assessment pt ambulating well independently. Pt states, " I have to be on a plane today at 12pm and wanted to come to the ER before". MD Carter at bedside. RN continuing to monitor.

## 2021-09-30 NOTE — ED PROVIDER NOTE - NSFOLLOWUPINSTRUCTIONS_ED_ALL_ED_FT
Can take tylenol 650mg every 6hrs as needed for pain.    Can take flexeril as prescribed.     Follow up with primary doctor within 1-2 days.    Return to ER immediately for fevers, persistent vomit, uncontrolled pain, incontinence, focal weakness/numbness, worsening dizziness.     Follow up with spine specialist.  Can call [phone] to schedule appointment or go online [email or web address]    Back Pain    Back pain is very common in adults. The cause of back pain is rarely dangerous and the pain often gets better over time. The cause of your back pain may not be known and may include strain of muscles or ligaments, degeneration of the spinal disks, or arthritis. Occasionally the pain may radiate down your leg(s). Over-the-counter medicines to reduce pain and inflammation are often the most helpful. Stretching and remaining active frequently helps the healing process.     SEEK IMMEDIATE MEDICAL CARE IF YOU HAVE ANY OF THE FOLLOWING SYMPTOMS: bowel or bladder control problems, unusual weakness or numbness in your arms or legs, nausea or vomiting, abdominal pain, fever, dizziness/lightheadedness.

## 2021-09-30 NOTE — ED PROVIDER NOTE - PATIENT PORTAL LINK FT
You can access the FollowMyHealth Patient Portal offered by [facility] by registering at the following website: [email or web address]. By joining [employer]’s FollowMyHealth portal, you will also be able to view your health information using other applications (apps) compatible with our system.

## 2021-09-30 NOTE — ED ADULT TRIAGE NOTE - NS ED TRIAGE AVPU SCALE
NURSE NOTES:

Repositioned and given sponge bath. Patient remains alert and oriented. Vitals stable, 
afebrile. Alert-The patient is alert, awake and responds to voice. The patient is oriented to time, place, and person. The triage nurse is able to obtain subjective information.

## 2021-09-30 NOTE — ED PROVIDER NOTE - NSDCPRINTRESULTS_ED_ALL_ED
Patient requests all Lab, Cardiology, and Radiology Results on their Discharge Instructions well-groomed/no distress

## 2021-09-30 NOTE — ED ADULT TRIAGE NOTE - CHIEF COMPLAINT QUOTE
PT states " I was going to a  and when I got out of the car and my leg gave out. " did not fall. no hitting head.  reports 9/10 lower back pain radiating to R lower leg. has a tingling sensation to R calf. ambulates with steady gait. no loss of bowel/ bladder movements.

## 2022-02-26 ENCOUNTER — EMERGENCY (EMERGENCY)
Facility: HOSPITAL | Age: 62
LOS: 1 days | Discharge: HOME | End: 2022-02-26
Admitting: EMERGENCY MEDICINE
Payer: MEDICAID

## 2022-02-26 VITALS
HEIGHT: 63 IN | WEIGHT: 130.07 LBS | HEART RATE: 88 BPM | TEMPERATURE: 98 F | SYSTOLIC BLOOD PRESSURE: 167 MMHG | DIASTOLIC BLOOD PRESSURE: 81 MMHG | RESPIRATION RATE: 16 BRPM

## 2022-02-26 DIAGNOSIS — Z88.1 ALLERGY STATUS TO OTHER ANTIBIOTIC AGENTS STATUS: ICD-10-CM

## 2022-02-26 DIAGNOSIS — Z88.0 ALLERGY STATUS TO PENICILLIN: ICD-10-CM

## 2022-02-26 DIAGNOSIS — M54.50 LOW BACK PAIN, UNSPECIFIED: ICD-10-CM

## 2022-02-26 DIAGNOSIS — Z88.6 ALLERGY STATUS TO ANALGESIC AGENT: ICD-10-CM

## 2022-02-26 PROCEDURE — 99284 EMERGENCY DEPT VISIT MOD MDM: CPT

## 2022-02-26 RX ORDER — OXYCODONE AND ACETAMINOPHEN 5; 325 MG/1; MG/1
1 TABLET ORAL ONCE
Refills: 0 | Status: DISCONTINUED | OUTPATIENT
Start: 2022-02-26 | End: 2022-02-26

## 2022-02-26 RX ORDER — LIDOCAINE 4 G/100G
1 CREAM TOPICAL ONCE
Refills: 0 | Status: COMPLETED | OUTPATIENT
Start: 2022-02-26 | End: 2022-02-26

## 2022-02-26 RX ORDER — DIAZEPAM 5 MG
2 TABLET ORAL ONCE
Refills: 0 | Status: DISCONTINUED | OUTPATIENT
Start: 2022-02-26 | End: 2022-02-26

## 2022-02-26 RX ADMIN — Medication 2 MILLIGRAM(S): at 10:20

## 2022-02-26 RX ADMIN — OXYCODONE AND ACETAMINOPHEN 1 TABLET(S): 5; 325 TABLET ORAL at 10:20

## 2022-02-26 RX ADMIN — LIDOCAINE 1 PATCH: 4 CREAM TOPICAL at 10:20

## 2022-02-26 NOTE — ED PROVIDER NOTE - CHPI ED SYMPTOMS NEG
no weakness, no urinary retention, no saddle anesthesia, no chest pain, no SOB, no abdominal pain, no nausea, no vomiting, no diarrhea, mo headache

## 2022-02-26 NOTE — ED PROVIDER NOTE - OBJECTIVE STATEMENT
60 y/o F with a PMHx of lumbar back pain presents to the ED c/o right-sided lumbar back pain x 1 day. Pt states her pain radiates down her right leg. Pt denies weakness, urinary retention, and saddle anesthesia. Pt denies chest pain, shortness of breath, abdominal pain, nausea, vomiting, diarrhea, and headache.

## 2022-02-26 NOTE — ED PROVIDER NOTE - CLINICAL SUMMARY MEDICAL DECISION MAKING FREE TEXT BOX
62 y/o F with a PMHx of lumbar back pain presents to the ED c/o right-sided lumbar back pain x 1 day that radiates down her right leg.  Will treat Pt for pain.

## 2022-02-26 NOTE — ED ADULT TRIAGE NOTE - CHIEF COMPLAINT QUOTE
Pt walked in with c/o non traumatic Rt lower back pain x1 day. Denies numbness or loss or continence.

## 2022-02-26 NOTE — ED PROVIDER NOTE - PATIENT PORTAL LINK FT
You can access the FollowMyHealth Patient Portal offered by [facility] by registering at the following website: [email or web address]. By joining [employer]’s FollowMyHealth portal, you will also be able to view your health information using other applications (apps) compatible with our system.

## 2022-02-26 NOTE — ED ADULT NURSE NOTE - OBJECTIVE STATEMENT
Pt came in c/o of atraumatic RL back pain x yesterday. Denies fever, chills, sob, cp, n/v/d,  changes in bowel/bladder control.. Ambulatory with steady gait. A&Ox3 speaking in full sentences.

## 2022-02-26 NOTE — ED BEHAVIORAL HEALTH NOTE - BEHAVIORAL HEALTH NOTE
PT is a 61 year old female with PMHx of lumbar back pain presents to the ED c/o right-sided lumbar back pain x 1 day.  SW was consulted to the ED by Provider to discuss PT DH vulnerable status and inquire if PT has any social needs she wants to address while in the ED.  SW spoke to PT and she did not express having any social needs at this time.  Team made aware and SW made available for further assistance.

## 2022-02-26 NOTE — ED ADULT NURSE NOTE - CHPI ED NUR SYMPTOMS NEG
Skin normal color for race, warm, dry and intact. No evidence of rash. no anorexia/no bladder dysfunction/no bowel dysfunction/no constipation/no difficulty bearing weight/no fatigue/no motor function loss/no neck tenderness/no numbness/no tingling

## 2022-02-26 NOTE — ED PROVIDER NOTE - CHIEF COMPLAINT
A (Catheter 6fr Jl4 Crv 100cm Radopq Braid Slct) catheter was inserted. The patient is a 61y Female complaining of back pain general.

## 2022-02-26 NOTE — ED ADULT NURSE NOTE - ED STAT RN HANDOFF DETAILS
received handoff on patient around 1145am from PRABHAKAR portillo; patient resting in chair; meds already given for pain; waiting in reevaluation; in no acute distress; will continue to monitor

## 2022-03-26 ENCOUNTER — EMERGENCY (EMERGENCY)
Facility: HOSPITAL | Age: 62
LOS: 1 days | Discharge: HOME | End: 2022-03-26
Attending: EMERGENCY MEDICINE | Admitting: EMERGENCY MEDICINE
Payer: MEDICAID

## 2022-03-26 VITALS
HEIGHT: 63 IN | OXYGEN SATURATION: 95 % | DIASTOLIC BLOOD PRESSURE: 93 MMHG | TEMPERATURE: 98 F | RESPIRATION RATE: 18 BRPM | HEART RATE: 96 BPM | WEIGHT: 134.04 LBS | SYSTOLIC BLOOD PRESSURE: 192 MMHG

## 2022-03-26 DIAGNOSIS — Z88.5 ALLERGY STATUS TO NARCOTIC AGENT: ICD-10-CM

## 2022-03-26 DIAGNOSIS — Z88.0 ALLERGY STATUS TO PENICILLIN: ICD-10-CM

## 2022-03-26 DIAGNOSIS — M54.41 LUMBAGO WITH SCIATICA, RIGHT SIDE: ICD-10-CM

## 2022-03-26 DIAGNOSIS — Z88.1 ALLERGY STATUS TO OTHER ANTIBIOTIC AGENTS STATUS: ICD-10-CM

## 2022-03-26 DIAGNOSIS — M54.50 LOW BACK PAIN, UNSPECIFIED: ICD-10-CM

## 2022-03-26 DIAGNOSIS — Z88.6 ALLERGY STATUS TO ANALGESIC AGENT: ICD-10-CM

## 2022-03-26 PROCEDURE — 99284 EMERGENCY DEPT VISIT MOD MDM: CPT

## 2022-03-26 RX ORDER — CYCLOBENZAPRINE HYDROCHLORIDE 10 MG/1
1 TABLET, FILM COATED ORAL
Qty: 20 | Refills: 0
Start: 2022-03-26

## 2022-03-26 RX ORDER — CYCLOBENZAPRINE HYDROCHLORIDE 10 MG/1
5 TABLET, FILM COATED ORAL ONCE
Refills: 0 | Status: COMPLETED | OUTPATIENT
Start: 2022-03-26 | End: 2022-03-26

## 2022-03-26 RX ORDER — OXYCODONE HYDROCHLORIDE 5 MG/1
5 TABLET ORAL ONCE
Refills: 0 | Status: DISCONTINUED | OUTPATIENT
Start: 2022-03-26 | End: 2022-03-26

## 2022-03-26 RX ORDER — OXYCODONE HYDROCHLORIDE 5 MG/1
1 TABLET ORAL
Qty: 12 | Refills: 0
Start: 2022-03-26 | End: 2022-03-28

## 2022-03-26 RX ADMIN — CYCLOBENZAPRINE HYDROCHLORIDE 5 MILLIGRAM(S): 10 TABLET, FILM COATED ORAL at 11:39

## 2022-03-26 RX ADMIN — OXYCODONE HYDROCHLORIDE 5 MILLIGRAM(S): 5 TABLET ORAL at 11:39

## 2022-03-26 NOTE — ED PROVIDER NOTE - PATIENT PORTAL LINK FT
You can access the FollowMyHealth Patient Portal offered by [facility] by registering at the following website: [email or web address]. By joining [employer]’s FollowMyHealth portal, you will also be able to view your health information using other applications (apps) compatible with our system.

## 2022-03-26 NOTE — ED BEHAVIORAL HEALTH NOTE - BEHAVIORAL HEALTH NOTE
SW was consulted to the ED by Provider to assist PT with [facility] referral.  A referral was made on PT behalf by SW.  Team made aware and SW made available for further assistance.

## 2022-03-26 NOTE — ED PROVIDER NOTE - NSFOLLOWUPINSTRUCTIONS_ED_ALL_ED_FT
Please use tylenol as first-line for pain and only use the oxycodone if needed for uncontrolled pain. Take flexeril to relax the muscles in spasm. Do not drink alcohol or drive while using these meds. Go to the ER immediately if you cannot control your bowels or bladder or if you develop new symptoms like fever or numbness of your groin.

## 2022-03-26 NOTE — ED PROVIDER NOTE - PHYSICAL EXAMINATION
Constitutional: awake and alert, in no acute distress  HEENT: head normocephalic and atraumatic. moist mucous membranes  Eyes: extraocular movements intact, normal conjunctiva  Neck: supple, normal ROM  Cardiovascular: regular rate   Pulmonary: no respiratory distress  Gastrointestinal: abdomen flat and nondistended  Skin: warm, dry, normal for ethnicity  Musculoskeletal: no edema, no deformity. no tenderness or stepoffs along length of spine. left lumbar paraspinal tension and tenderness. full ROM of both legs.  Neurological: 5/5 strength to flexion, extension, dorsiflexion, and plantarflexion bilaterally. sensation somewhat diminished on the lateral aspect of right leg. intact in bilateral medial thighs and saddle area  Psychiatric: calm and cooperative

## 2022-03-26 NOTE — ED PROVIDER NOTE - OBJECTIVE STATEMENT
61-year-old woman with past medical history of sciatica and PTSD who presents to the ED complaining of right low back pain radiating down the right leg x5 days. She has had this pain before and tries to control it with tylenol, but it hasn't been helping. Previously had good response with short course of flexeril and oxycodone. She has diminished sensation on lateral aspect of right leg, no saddle anesthesia, bowel or bladder incontinence or retention, fever, chills, rash, weakness.

## 2022-03-26 NOTE — ED PROVIDER NOTE - CLINICAL SUMMARY MEDICAL DECISION MAKING FREE TEXT BOX
Patient with recurrent sciatica, no evidence of neurovascularly emergency including cauda equina syndrome. VS with significant hypertension, which patient says is normal for her when feeling stressed (currently dealing with PTSD-related issues). Will prescribe short course of muscle relaxant and analgesic and she is going to follow up with a back specialist soon.

## 2022-05-20 ENCOUNTER — EMERGENCY (EMERGENCY)
Facility: HOSPITAL | Age: 62
LOS: 1 days | Discharge: HOME | End: 2022-05-20
Attending: EMERGENCY MEDICINE | Admitting: EMERGENCY MEDICINE
Payer: MEDICAID

## 2022-05-20 VITALS — DIASTOLIC BLOOD PRESSURE: 98 MMHG | SYSTOLIC BLOOD PRESSURE: 179 MMHG | HEART RATE: 88 BPM

## 2022-05-20 VITALS
WEIGHT: 139.99 LBS | HEIGHT: 63 IN | RESPIRATION RATE: 18 BRPM | OXYGEN SATURATION: 97 % | TEMPERATURE: 98 F | DIASTOLIC BLOOD PRESSURE: 95 MMHG | HEART RATE: 100 BPM | SYSTOLIC BLOOD PRESSURE: 166 MMHG
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
## 2022-05-20 DIAGNOSIS — G89.29 OTHER CHRONIC PAIN: ICD-10-CM

## 2022-05-20 DIAGNOSIS — R20.0 ANESTHESIA OF SKIN: ICD-10-CM

## 2022-05-20 DIAGNOSIS — Z88.0 ALLERGY STATUS TO PENICILLIN: ICD-10-CM

## 2022-05-20 DIAGNOSIS — Z88.1 ALLERGY STATUS TO OTHER ANTIBIOTIC AGENTS STATUS: ICD-10-CM

## 2022-05-20 DIAGNOSIS — Z88.6 ALLERGY STATUS TO ANALGESIC AGENT: ICD-10-CM

## 2022-05-20 DIAGNOSIS — Z88.5 ALLERGY STATUS TO NARCOTIC AGENT: ICD-10-CM
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
## 2022-05-20 DIAGNOSIS — M54.41 LUMBAGO WITH SCIATICA, RIGHT SIDE: ICD-10-CM

## 2022-05-20 LAB
ALBUMIN SERPL ELPH-MCNC: 3.9 G/DL — SIGNIFICANT CHANGE UP (ref 3.4–5)
ALP SERPL-CCNC: 126 U/L — HIGH (ref 40–120)
ALT FLD-CCNC: 53 U/L — HIGH (ref 12–42)
ANION GAP SERPL CALC-SCNC: 8 MMOL/L — LOW (ref 9–16)
AST SERPL-CCNC: 27 U/L — SIGNIFICANT CHANGE UP (ref 15–37)
BASOPHILS # BLD AUTO: 0 K/UL — SIGNIFICANT CHANGE UP (ref 0–0.2)
BASOPHILS NFR BLD AUTO: 0 % — SIGNIFICANT CHANGE UP (ref 0–2)
BILIRUB SERPL-MCNC: 0.4 MG/DL — SIGNIFICANT CHANGE UP (ref 0.2–1.2)
BUN SERPL-MCNC: 12 MG/DL — SIGNIFICANT CHANGE UP (ref 7–23)
CALCIUM SERPL-MCNC: 9.4 MG/DL — SIGNIFICANT CHANGE UP (ref 8.5–10.5)
CHLORIDE SERPL-SCNC: 103 MMOL/L — SIGNIFICANT CHANGE UP (ref 96–108)
CO2 SERPL-SCNC: 27 MMOL/L — SIGNIFICANT CHANGE UP (ref 22–31)
CREAT SERPL-MCNC: 0.78 MG/DL — SIGNIFICANT CHANGE UP (ref 0.5–1.3)
EGFR: 85 ML/MIN/1.73M2 — SIGNIFICANT CHANGE UP
EOSINOPHIL # BLD AUTO: 0.45 K/UL — SIGNIFICANT CHANGE UP (ref 0–0.5)
EOSINOPHIL NFR BLD AUTO: 5 % — SIGNIFICANT CHANGE UP (ref 0–6)
GLUCOSE BLDC GLUCOMTR-MCNC: 121 MG/DL — HIGH (ref 70–99)
GLUCOSE SERPL-MCNC: 110 MG/DL — HIGH (ref 70–99)
HCT VFR BLD CALC: 43 % — SIGNIFICANT CHANGE UP (ref 34.5–45)
HGB BLD-MCNC: 14.4 G/DL — SIGNIFICANT CHANGE UP (ref 11.5–15.5)
LYMPHOCYTES # BLD AUTO: 2.78 K/UL — SIGNIFICANT CHANGE UP (ref 1–3.3)
LYMPHOCYTES # BLD AUTO: 31 % — SIGNIFICANT CHANGE UP (ref 13–44)
MANUAL SMEAR VERIFICATION: SIGNIFICANT CHANGE UP
MCHC RBC-ENTMCNC: 32 PG — SIGNIFICANT CHANGE UP (ref 27–34)
MCHC RBC-ENTMCNC: 33.5 GM/DL — SIGNIFICANT CHANGE UP (ref 32–36)
MCV RBC AUTO: 95.6 FL — SIGNIFICANT CHANGE UP (ref 80–100)
MONOCYTES # BLD AUTO: 0.09 K/UL — SIGNIFICANT CHANGE UP (ref 0–0.9)
MONOCYTES NFR BLD AUTO: 1 % — LOW (ref 2–14)
NEUTROPHILS # BLD AUTO: 5.65 K/UL — SIGNIFICANT CHANGE UP (ref 1.8–7.4)
NEUTROPHILS NFR BLD AUTO: 63 % — SIGNIFICANT CHANGE UP (ref 43–77)
NRBC # BLD: 0 /100 — SIGNIFICANT CHANGE UP (ref 0–0)
NRBC # BLD: SIGNIFICANT CHANGE UP /100 WBCS (ref 0–0)
PLAT MORPH BLD: NORMAL — SIGNIFICANT CHANGE UP
PLATELET # BLD AUTO: 370 K/UL — SIGNIFICANT CHANGE UP (ref 150–400)
POTASSIUM SERPL-MCNC: 4.3 MMOL/L — SIGNIFICANT CHANGE UP (ref 3.5–5.3)
POTASSIUM SERPL-SCNC: 4.3 MMOL/L — SIGNIFICANT CHANGE UP (ref 3.5–5.3)
PROT SERPL-MCNC: 8.3 G/DL — HIGH (ref 6.4–8.2)
RBC # BLD: 4.5 M/UL — SIGNIFICANT CHANGE UP (ref 3.8–5.2)
RBC # FLD: 13.1 % — SIGNIFICANT CHANGE UP (ref 10.3–14.5)
RBC BLD AUTO: NORMAL — SIGNIFICANT CHANGE UP
SODIUM SERPL-SCNC: 138 MMOL/L — SIGNIFICANT CHANGE UP (ref 132–145)
WBC # BLD: 8.97 K/UL — SIGNIFICANT CHANGE UP (ref 3.8–10.5)
WBC # FLD AUTO: 8.97 K/UL — SIGNIFICANT CHANGE UP (ref 3.8–10.5)

## 2022-05-20 PROCEDURE — 99284 EMERGENCY DEPT VISIT MOD MDM: CPT

## 2022-05-20 RX ORDER — OXYCODONE AND ACETAMINOPHEN 5; 325 MG/1; MG/1
1 TABLET ORAL ONCE
Refills: 0 | Status: DISCONTINUED | OUTPATIENT
Start: 2022-05-20 | End: 2022-05-20

## 2022-05-20 RX ORDER — MORPHINE SULFATE 50 MG/1
2 CAPSULE, EXTENDED RELEASE ORAL ONCE
Refills: 0 | Status: DISCONTINUED | OUTPATIENT
Start: 2022-05-20 | End: 2022-05-20

## 2022-05-20 RX ORDER — CYCLOBENZAPRINE HYDROCHLORIDE 10 MG/1
10 TABLET, FILM COATED ORAL ONCE
Refills: 0 | Status: COMPLETED | OUTPATIENT
Start: 2022-05-20 | End: 2022-05-20

## 2022-05-20 RX ADMIN — OXYCODONE AND ACETAMINOPHEN 1 TABLET(S): 5; 325 TABLET ORAL at 12:33

## 2022-05-20 RX ADMIN — MORPHINE SULFATE 2 MILLIGRAM(S): 50 CAPSULE, EXTENDED RELEASE ORAL at 10:37

## 2022-05-20 RX ADMIN — CYCLOBENZAPRINE HYDROCHLORIDE 10 MILLIGRAM(S): 10 TABLET, FILM COATED ORAL at 10:37

## 2022-05-20 NOTE — ED PROVIDER NOTE - NEUROLOGICAL, MLM
Alert and oriented, speech fluent and appropriate no motor or sensory deficits. + straight leg raise on right LE

## 2022-05-20 NOTE — ED PROVIDER NOTE - OBJECTIVE STATEMENT
Triage VS: HR: 100 BP: 166/95 Resp: 18 Temp: 98.5 F O2: 97%    Triage Note: here for acute numbness to right lower leg radiating to toes to right foot since 5:15am today- states "numbness was so bad it caused her to fall to the ground" Triage VS: HR: 100 BP: 166/95 Resp: 18 Temp: 98.5 F O2: 97%    Triage Note: here for acute numbness to right lower leg radiating to toes to right foot since 5:15am today- states "numbness was so bad it caused her to fall to the ground"    62 yo woman with the complaint of chronic lower back pain  with prior episodes of pain radiation down right leg - today with the same pain.     no sensory changes to legs or groin area no urinary or bowel incontinece no weakness in legs,

## 2022-05-20 NOTE — ED PROVIDER NOTE - NSFOLLOWUPINSTRUCTIONS_ED_ALL_ED_FT
take medications as directed     follow up with your orthopedic  doctor next week     stay well hydrated     return to ER for any concern

## 2022-05-20 NOTE — ED ADULT TRIAGE NOTE - PAIN RATING/NUMBER SCALE (0-10): ACTIVITY
IP Acute Occupational Therapy Treatment  Plan of Care Note    Diagnosis:  1. Failed skin graft    2. Panniculitis        Co-morbidities:   Patient Active Problem List   Diagnosis   • Panniculitis   • Pulmonary embolism (CMS/HCC)   • Type 2 diabetes mellitus (CMS/HCC)   • Morbid obesity (CMS/HCC)   • Hypertension   • Hyperlipidemia   • Failed skin graft       Precautions:  Precautions  Other Precautions: reduced pressure and aggravation to wound to allow for proper healing  (05/28/18 1000)  Precautions Comments: Limit strenuous activity  (05/28/18 1000)    Below is key subjective and objective information from the last 24 hours.  For further details, please refer to the OT Assess/Treat/Goals flowsheet.    Subjective:  Subjective: They told me I shouldn't walk too much.  (06/08/18 1536)    Prior Living Situation:  Type of Home: Apartment (1st floor) (05/25/18 1246)  Lives With:  (roommate) (05/25/18 1246)    Objective:  Communication/Cognition  Communication: Clear speech (06/08/18 1536)  Overall Cognitive Status: Within Functional Limits (06/08/18 1536)  Arousal/Alertness: Appropriate responses to stimuli (06/08/18 1536)  Attention: Appears intact (06/08/18 1536)  Memory: Appears intact (06/08/18 1536)  Following Verbal Directions: Follows all directions without difficulty (06/08/18 1536)  Safety Judgement: Good awareness of safety precautions (06/08/18 1536)  Awareness of Deficits: Fully aware of deficits (06/08/18 1536)  Problem Solving: Able to problem solve independently (06/08/18 1536)         Education:   On this date, the patient was educated on therapeutic exercises, safety awareness training, importance of activity while in hospital, and breathing techniques during exertion.    The response to education was: Verbalizes understanding, Demonstrates understanding and Needs reinforcement.      Assessment:  Patient presents significantly below baseline which was independent with mobility  and ADLs. Emphasis of  session included therapeutic exercises for bilateral upper extremities strengthening and coordination. Patient's overall level of function is limited by medical condition: lower abdominal wound. Patient's response to treatment was good; the patient was cooperative and pleasant. Continued skilled therapy is indicated to address functional deficits listed above and progress towards occupational therapy goals.     OT Identified Barriers to Discharge: extensive wound care  Recommendations for Discharge: OT: 24 Hour assist, Sub-acute nursing home    Goals:  Short Term Goals to Be Reviewed On: 06/15/18 (06/08/18 1536)  Short Term Goals Are The Same as Discharge Goals: Yes (06/08/18 1536)  Goal Agreement: Patient agrees with goals and treatment plan (06/08/18 1536)  Bathing Discharge Goal 1: MIN A for UE cares, Max A for LE cares to reduce strain to wound  (06/08/18 1536)  Dressing Discharge Goal 1: SBA to MIN A with adaptive techniques  (06/08/18 1536)  Home Setting Transfer Discharge Goal 1: MOD IND ( stand pivot to toilet) when lines are managable (06/08/18 1536)    Plan:  OT Frequency: 3 days/week  Treatment Plan for Next Session: Upper body activity as tolerated   Planned Interventions:  Treatment Interventions: ADL retraining;Functional transfer training;Endurance training;Patient/Family training;Neuro muscular reeducation;Compensatory technique education (06/08/18 1536)    Equipment:  PT/OT ADL Equipment for Discharge: None anticipated (06/08/18 1536)  PT/OT Mobility Equipment for Discharge: continue to assess need for bariatric 2WW vs cane (06/08/18 1435)    Treatment Time:  OT Time Spent: 20 minutes (06/08/18 1536)   8

## 2022-05-20 NOTE — ED ADULT TRIAGE NOTE - CHIEF COMPLAINT QUOTE
here for acute numbness to right lower leg radiating to toes to right foot since 5:15am today- states "numbness was so bad it caused her to fall to the ground"

## 2022-05-20 NOTE — ED ADULT NURSE NOTE - NSIMPLEMENTINTERV_GEN_ALL_ED
Implemented All Fall Risk Interventions:  [place] to call system. Call bell, personal items and telephone within reach. Instruct patient to call for assistance. Room bathroom lighting operational. Non-slip footwear when patient is off stretcher. Physically safe environment: no spills, clutter or unnecessary equipment. Stretcher in lowest position, wheels locked, appropriate side rails in place. Provide visual cue, wrist band, yellow gown, etc. Monitor gait and stability. Monitor for mental status changes and reorient to person, place, and time. Review medications for side effects contributing to fall risk. Reinforce activity limits and safety measures with patient and family.

## 2022-05-20 NOTE — ED ADULT NURSE NOTE - TEMPLATE
Orthopedic Subsequent Stages Histo Method Verbiage: Using a similar technique to that described above, a thin layer of tissue was removed from all areas where tumor was visible on the previous stage.  The tissue was again oriented, mapped, dyed, and processed as above.

## 2022-05-20 NOTE — ED ADULT NURSE NOTE - OBJECTIVE STATEMENT
Pt presents to ED c/o RLE pain for weeks and sudden onset weakness while getting onto plane today. Pt presents to ED c/o RLE pain for weeks and sudden onset worsening pain and weakness while getting onto plane today. Pt reports falling over from the weakness, denies any head injury. Pt was able to get up and ambulate out of airport. Pt currently c/o pain, NIH 0, is A&Ox4, speaking in full complete sentences.

## 2022-06-18 NOTE — ED PROVIDER NOTE - PATIENT PORTAL LINK FT
75 You can access the FollowMyHealth Patient Portal offered by [facility] by registering at the following website: [email or web address]. By joining [employer]’s FollowMyHealth portal, you will also be able to view your health information using other applications (apps) compatible with our system.

## 2022-07-01 NOTE — ED ADULT NURSE NOTE - CHIEF COMPLAINT QUOTE
pt states "Dr. Lombardo told me to come back to change my antibiotic." pt reports she was here two days ago for low back pain, dx with UTI and sent home on antibiotics. pt states she is unable to tolerate macrobid and told the PA that but still was prescribed that medication. pt states "I only tolerate Bactrim." Statement Selected

## 2022-07-25 NOTE — ED ADULT NURSE NOTE - OBJECTIVE STATEMENT
Medication(s) Requested: Tizanidine 4 mg q8h prn #45 last filled 5/27/22 per epic  Is the patient due for refill of this medication(s): Yes    Symptoms:  History of severe right buttock, posterior thigh, and calf pain   Low back pain with spasm  Spine Surgery:  History of left L5-S1 diskectomy over 20 years ago   History of right L4-5 extruded disc herniation 3 years ago    Last office visit: 5/27/22  Plan:  Obtain updated MRI of lumbar spine without contrast; no sedation   Tizanidine for symptom management in addition to hydrocodone  Follow-up with neuro surgery after completion of MRI to discuss treatment options, review imaging   Return as necessary   walk into triage with right lower back and posterior leg pain

## 2022-08-09 ENCOUNTER — EMERGENCY (EMERGENCY)
Facility: HOSPITAL | Age: 62
LOS: 1 days | Discharge: HOME | End: 2022-08-09
Attending: EMERGENCY MEDICINE | Admitting: EMERGENCY MEDICINE

## 2022-08-09 VITALS
RESPIRATION RATE: 18 BRPM | HEART RATE: 95 BPM | WEIGHT: 145.51 LBS | HEIGHT: 63 IN | TEMPERATURE: 98 F | OXYGEN SATURATION: 97 % | SYSTOLIC BLOOD PRESSURE: 165 MMHG | DIASTOLIC BLOOD PRESSURE: 90 MMHG

## 2022-08-09 DIAGNOSIS — Z88.5 ALLERGY STATUS TO NARCOTIC AGENT: ICD-10-CM

## 2022-08-09 DIAGNOSIS — F41.9 ANXIETY DISORDER, UNSPECIFIED: ICD-10-CM

## 2022-08-09 DIAGNOSIS — Z88.1 ALLERGY STATUS TO OTHER ANTIBIOTIC AGENTS STATUS: ICD-10-CM

## 2022-08-09 DIAGNOSIS — M54.50 LOW BACK PAIN, UNSPECIFIED: ICD-10-CM

## 2022-08-09 DIAGNOSIS — Z88.0 ALLERGY STATUS TO PENICILLIN: ICD-10-CM

## 2022-08-09 DIAGNOSIS — M54.41 LUMBAGO WITH SCIATICA, RIGHT SIDE: ICD-10-CM

## 2022-08-09 DIAGNOSIS — Z88.6 ALLERGY STATUS TO ANALGESIC AGENT: ICD-10-CM

## 2022-08-09 PROCEDURE — 99284 EMERGENCY DEPT VISIT MOD MDM: CPT

## 2022-08-09 NOTE — ED ADULT TRIAGE NOTE - CHIEF COMPLAINT QUOTE
Pt came in c/o of right lower back pain radiating down right leg with numbness x 1 week. Denies trauma. Ambulatory with steady gait

## 2022-08-09 NOTE — ED PROVIDER NOTE - MUSCULOSKELETAL MINIMAL EXAM
+ TTP R paraspinal muscles lumbo/sacral Region., ambulating with ease and without assistance able to bear weight/normal range of motion

## 2022-08-09 NOTE — ED ADULT NURSE NOTE - NSIMPLEMENTINTERV_GEN_ALL_ED
Implemented All Universal Safety Interventions:  [place] to call system. Call bell, personal items and telephone within reach. Instruct patient to call for assistance. Room bathroom lighting operational. Non-slip footwear when patient is off stretcher. Physically safe environment: no spills, clutter or unnecessary equipment. Stretcher in lowest position, wheels locked, appropriate side rails in place.

## 2022-08-09 NOTE — ED PROVIDER NOTE - IV ALTEPLASE DOOR HIDDEN
show Additional Notes: Patient consent was obtained to proceed with the visit and recommended plan of care after discussion of all risks and benefits, including the risks of COVID-19 exposure. Detail Level: Simple Render Risk Assessment In Note?: yes

## 2022-08-09 NOTE — ED PROVIDER NOTE - OBJECTIVE STATEMENT
62 yo F, pmhx of chronic R sciatic nerve pain, here c/o R buttock pain radiating to her right three toes associated with tingling in toes consistent with prior sciatic pain. Patient does not follow regularly outpatient with anyone despite being instructed in the past. States this exacerbation has been since saturday. States she was able to walk in the ED but was limping. Denies bowel/bladder incontinence. No saddle anesthesia. Requesting oxycodone for a few days for a flight to [place] she is going on on friday. Of note she flew recently from [place] to [facility].

## 2022-08-09 NOTE — ED ADULT NURSE NOTE - OBJECTIVE STATEMENT
Pt presenting with lower back pain. Pain radiates down R leg. Pt endorsing numbness to toes. No trauma/injury

## 2022-08-09 NOTE — ED PROVIDER NOTE - PATIENT PORTAL LINK FT
You can access the FollowMyHealth Patient Portal offered by [facility] by registering at the following website: [email or web address]. By joining [employer]’s FollowMyHealth portal, you will also be able to view your health information using other applications (apps) compatible with our system.

## 2022-08-09 NOTE — ED PROVIDER NOTE - CLINICAL SUMMARY MEDICAL DECISION MAKING FREE TEXT BOX
Patient became upset when she asked for prescription for percocet and I explained to her that I would treat her with muscle relaxer, tylenol, lidocaine patch. She stated "Other doctors have given it to me and now you wont?" Patient walked out.

## 2023-02-02 ENCOUNTER — EMERGENCY (EMERGENCY)
Facility: HOSPITAL | Age: 63
LOS: 1 days | Discharge: HOME | End: 2023-02-02
Admitting: EMERGENCY MEDICINE
Payer: COMMERCIAL

## 2023-02-02 VITALS
SYSTOLIC BLOOD PRESSURE: 177 MMHG | RESPIRATION RATE: 18 BRPM | OXYGEN SATURATION: 97 % | TEMPERATURE: 98 F | HEART RATE: 87 BPM | DIASTOLIC BLOOD PRESSURE: 90 MMHG

## 2023-02-02 VITALS
HEIGHT: 65 IN | OXYGEN SATURATION: 96 % | SYSTOLIC BLOOD PRESSURE: 152 MMHG | HEART RATE: 97 BPM | RESPIRATION RATE: 16 BRPM | DIASTOLIC BLOOD PRESSURE: 84 MMHG | TEMPERATURE: 98 F | WEIGHT: 134.92 LBS

## 2023-02-02 PROCEDURE — 99284 EMERGENCY DEPT VISIT MOD MDM: CPT

## 2023-02-02 RX ORDER — LIDOCAINE 4 G/100G
1 CREAM TOPICAL ONCE
Refills: 0 | Status: COMPLETED | OUTPATIENT
Start: 2023-02-02 | End: 2023-02-02

## 2023-02-02 RX ADMIN — LIDOCAINE 1 PATCH: 4 CREAM TOPICAL at 14:50

## 2023-02-02 NOTE — ED ADULT NURSE NOTE - OBJECTIVE STATEMENT
61 yo F PMHx sciatica presents to ED co back pain radiating down the R leg x 2 years, worsening since Monday. Pt describes back pain as an ache with shooting numbness and tingling radiating down the right leg. Reports "nausea from the pain." Pt reports flying from [place] last week and is going to [facility] tomorrow and is concerned about the long flight. Pt was able to ambulate, but reports increased pain. Denies loss of bowel / bladder function. Denies CP, SOB. Denies injury.

## 2023-02-02 NOTE — ED ADULT NURSE NOTE - CHPI ED NUR SYMPTOMS NEG
no anorexia/no bowel dysfunction/no constipation/no fatigue/no motor function loss/no neck tenderness

## 2023-02-02 NOTE — ED ADULT TRIAGE NOTE - CHIEF COMPLAINT QUOTE
Pt presents to ED C/O reoccurring R sided lower back pain radiating down R leg x 2 years. Pt states, " I flew from [place] on Monday and I have the pain again, it usually lasts about two days and then goes away". Pt denies CP, SOB. Denies leg swelling. Able to ambulate independently, took Tylenol at 1230 PTA.

## 2023-02-02 NOTE — ED PROVIDER NOTE - PATIENT PORTAL LINK FT
You can access the FollowMyHealth Patient Portal offered by [facility] by registering at the following website: [email or web address]. By joining [employer]’s FollowMyHealth portal, you will also be able to view your health information using other applications (apps) compatible with our system.

## 2023-02-02 NOTE — ED PROVIDER NOTE - NSICDXPASTMEDICALHX_GEN_ALL_CORE_FT
PAST MEDICAL HISTORY:  Anxiety     Disc disorder     Lumbar back pain     Sciatica      no anomalies noted

## 2023-02-02 NOTE — ED PROVIDER NOTE - PHYSICAL EXAMINATION
CONSTITUTIONAL: Well-appearing; well-nourished; in no apparent distress.   HEAD: Normocephalic; atraumatic.   NECK: Supple; non-tender;   CARDIOVASCULAR: Normal S1, S2; no murmurs, rubs, or gallops. Regular rate and rhythm.   RESPIRATORY: Breathing easily; breath sounds clear and equal bilaterally; no wheezes, rhonchi, or rales.  MSK: FROM at all extremities,   Back: +ttp to R lumbar paraspinal muscles, +SLR   Neuro: CN 2-12 intact, normal finger to nose, normal gait, strength normal

## 2023-02-02 NOTE — ED PROVIDER NOTE - OBJECTIVE STATEMENT
61 yo f with pmh of sciatica c/o R low back pain radiating to R buttock and leg x 4 days. Feels similar to previous back pain. Denies trauma but recently flew from [place]. Pain worse with different positions. Took tylenol with no relief. Pt allergic to NSAIDs. States she has been under increased stress because her mother recently passed away. Denies abd pain, fever, chills, hematuria, saddle anesthesia, incontinence. Pt has appt back in [place] with a specialist.

## 2023-02-02 NOTE — ED PROVIDER NOTE - CLINICAL SUMMARY MEDICAL DECISION MAKING FREE TEXT BOX
63 yo f with pmh of sciatica c/o R low back pain radiating to R buttock and leg x 4 days. Feels similar to previous back pain. Denies trauma but recently flew from [place]. Pain worse with different positions. Took tylenol with no relief. Pt allergic to NSAIDs. States she has been under increased stress because her mother recently passed away. Denies abd pain, fever, chills, hematuria, saddle anesthesia, incontinence. +ttp to R lumbar paraspinal muscles, +SLR. No red flags. Will treat pain

## 2023-02-04 DIAGNOSIS — Z87.39 PERSONAL HISTORY OF OTHER DISEASES OF THE MUSCULOSKELETAL SYSTEM AND CONNECTIVE TISSUE: ICD-10-CM

## 2023-02-04 DIAGNOSIS — Z88.6 ALLERGY STATUS TO ANALGESIC AGENT: ICD-10-CM

## 2023-02-04 DIAGNOSIS — M54.50 LOW BACK PAIN, UNSPECIFIED: ICD-10-CM

## 2023-02-04 DIAGNOSIS — Z88.0 ALLERGY STATUS TO PENICILLIN: ICD-10-CM

## 2023-02-04 DIAGNOSIS — F41.9 ANXIETY DISORDER, UNSPECIFIED: ICD-10-CM

## 2023-02-04 DIAGNOSIS — Z88.5 ALLERGY STATUS TO NARCOTIC AGENT: ICD-10-CM

## 2023-02-04 DIAGNOSIS — M54.41 LUMBAGO WITH SCIATICA, RIGHT SIDE: ICD-10-CM

## 2023-02-04 DIAGNOSIS — Z88.8 ALLERGY STATUS TO OTHER DRUGS, MEDICAMENTS AND BIOLOGICAL SUBSTANCES: ICD-10-CM

## 2023-02-09 NOTE — ED ADULT TRIAGE NOTE - TEMPERATURE IN CELSIUS (DEGREES C)
Returned patient's call regarding concern with  this AM  Her roommate informed her she was talking in her sleep overnight and then had  this morning for a first time in awhile  We reviewed that trazodone likely would not cause hallucinations  She does say she has been under some stress lately  She will call the office if symptoms persist/worsen 
36.7

## 2023-03-08 NOTE — ED PROVIDER NOTE - MEDICAL DECISION MAKING DETAILS
SS noted.  Discussed at length risk of opiates.  Developed clear plan for small addn supply and fu PCP and PT early this week.  No red flags for spinal cord dz or other cause of back pain. n/a

## 2023-04-03 ENCOUNTER — EMERGENCY (EMERGENCY)
Facility: HOSPITAL | Age: 63
LOS: 1 days | Discharge: HOME | End: 2023-04-03
Admitting: EMERGENCY MEDICINE
Payer: MEDICAID

## 2023-04-03 VITALS
HEART RATE: 95 BPM | SYSTOLIC BLOOD PRESSURE: 163 MMHG | OXYGEN SATURATION: 97 % | TEMPERATURE: 98 F | HEIGHT: 64 IN | DIASTOLIC BLOOD PRESSURE: 85 MMHG | WEIGHT: 138.01 LBS | RESPIRATION RATE: 19 BRPM

## 2023-04-03 PROCEDURE — 99284 EMERGENCY DEPT VISIT MOD MDM: CPT

## 2023-04-03 RX ORDER — OXYCODONE HYDROCHLORIDE 5 MG/1
5 TABLET ORAL ONCE
Refills: 0 | Status: DISCONTINUED | OUTPATIENT
Start: 2023-04-03 | End: 2023-04-03

## 2023-04-03 RX ORDER — OXYCODONE HYDROCHLORIDE 5 MG/1
1 TABLET ORAL
Qty: 9 | Refills: 0
Start: 2023-04-03 | End: 2023-04-05

## 2023-04-03 RX ORDER — LIDOCAINE 4 G/100G
1 CREAM TOPICAL ONCE
Refills: 0 | Status: COMPLETED | OUTPATIENT
Start: 2023-04-03 | End: 2023-04-03

## 2023-04-03 RX ADMIN — LIDOCAINE 1 PATCH: 4 CREAM TOPICAL at 19:12

## 2023-04-03 RX ADMIN — OXYCODONE HYDROCHLORIDE 5 MILLIGRAM(S): 5 TABLET ORAL at 19:12

## 2023-04-03 NOTE — ED PROVIDER NOTE - NEUROLOGICAL, MLM
Alert and oriented, no facial droop, no pronator drift, gross intact sensation but decreased RLE compared to LLE, ambulating

## 2023-04-03 NOTE — ED ADULT TRIAGE NOTE - BANDS:
Allergy;
You can access the FollowMyHealth Patient Portal offered by [facility] by registering at the following website: [email or web address]. By joining [employer]’s FollowMyHealth portal, you will also be able to view your health information using other applications (apps) compatible with our system.

## 2023-04-03 NOTE — ED PROVIDER NOTE - PATIENT PORTAL LINK FT
You can access the FollowMyHealth Patient Portal offered by [facility] by registering at the following website: [email or web address]. By joining [email or web address]’s FollowMyHealth portal, you will also be able to view your health information using other applications (apps) compatible with our system.

## 2023-04-03 NOTE — ED PROVIDER NOTE - CLINICAL SUMMARY MEDICAL DECISION MAKING FREE TEXT BOX
62-year-old female with no significant past medical history presents complaining of right low back pain x1 day.  Pt with sudden onset of right low back pain, radiating to RLE with associated numbness which pt reports his similar to her usual back pain exacerbations, no other associated ssx or red flag ssx. Similar visit last month after travel. Exam as above. Concern for right low back pain w/ sciatica. will also obtain UA to assess for UTI, pyelo and nephrolithiasis given associated nausea. Recommended pt to obtain CT lumbar spine given associated numbness but she reports this always happens during her exacerbations and prefers to wait for MRI later this month. Will provide pain control and reassess.

## 2023-04-03 NOTE — ED PROVIDER NOTE - OBJECTIVE STATEMENT
62-year-old female with no significant past medical history presents complaining of right low back pain x1 day.  Patient reports sudden onset of right low back pain that woke her up from sleep around 3 AM, has been constant since then, radiating to right buttock and leg., worse with movement and ambulation.  Patient took Tylenol 1500 mg x 2 without relief; anaphylaxis to NSAIDs.  Patient denies recent injury, fall or trauma but notably just flew back from [place] and her last episode of back pain was also incited after travel. Pt requesting same meds as her last visit last month, has to fly again tomorrow to [facility]. Pt has hx of chronic similar back pain, has MRI pending this month with specialist. Pt endorses some right leg numbness which she states always happens during her episodes of severe back pain. Pt also notes some mild nausea 2/2 severe pain. Denies fever/chills, weight loss, night sweats, cp, sob, abd pain, dysuria, urinary frequency or urgency, difficulty urinating, hematuria, urinary or bowel incontinence, weakness, difficulty walking, diarrhea, constipation. No hx of malingnacy or IVDU. Pt ambulating at bedside w/ antalgic gait.

## 2023-04-03 NOTE — ED PROVIDER NOTE - PHYSICAL EXAMINATION
No c/t/l midline TTP, no overlying erythema, swelling or edema, no stepoff or crepitus, full active ROM  +Right lumbar paraspinal reproducible tenderness, +right straight leg raise, decreased right hip strength 2/2 pain but FROM and strength 5/5 at knee and ankle, decreased right lower leg sensation compared to left lower leg but grossly intact, no saddle anesthesia, warm and well perfused distally with 2+ pulses  Ambulating with steady but antalgic gait

## 2023-04-03 NOTE — ED PROVIDER NOTE - PROGRESS NOTE DETAILS
Pt requesting to leave as oxycodone significantly helped her pain, will send short course of oxy x 3 days, recommended supportive care as well  UA was not obtained and pt prefers to leave w/o results despite knowing we cannot r/o UTI or nephrolithiasis without this or CT. This is reasonable as the dx of msk back pain is more likely and there is low suspicion for these etiologies.   Pt prefers to leave without obtaining discharge paper work. Pt agrees with plan for discharge and further evaluation with outpatient follow up. Strict return precautions given, patient verbalized understanding and all questions answered. Pt currently stable for discharge.

## 2023-04-05 DIAGNOSIS — Z88.1 ALLERGY STATUS TO OTHER ANTIBIOTIC AGENTS STATUS: ICD-10-CM

## 2023-04-05 DIAGNOSIS — M54.50 LOW BACK PAIN, UNSPECIFIED: ICD-10-CM

## 2023-04-05 DIAGNOSIS — R11.0 NAUSEA: ICD-10-CM

## 2023-04-05 DIAGNOSIS — G89.29 OTHER CHRONIC PAIN: ICD-10-CM

## 2023-04-05 DIAGNOSIS — Z88.6 ALLERGY STATUS TO ANALGESIC AGENT: ICD-10-CM

## 2023-04-05 DIAGNOSIS — F41.9 ANXIETY DISORDER, UNSPECIFIED: ICD-10-CM

## 2023-04-05 DIAGNOSIS — Z88.5 ALLERGY STATUS TO NARCOTIC AGENT: ICD-10-CM

## 2023-04-05 DIAGNOSIS — R20.0 ANESTHESIA OF SKIN: ICD-10-CM

## 2023-04-05 DIAGNOSIS — Z88.0 ALLERGY STATUS TO PENICILLIN: ICD-10-CM

## 2023-04-05 DIAGNOSIS — M54.41 LUMBAGO WITH SCIATICA, RIGHT SIDE: ICD-10-CM

## 2023-05-25 NOTE — ED ADULT NURSE NOTE - NS ED NURSE LEVEL OF CONSCIOUSNESS SPEECH
3/22/2023: Not sexually active (UPT/vaginal std screening deferred)  Known anemia - unable to tolerate iron. Last Hgb 2/20/23 8.8.    Will complete fasting labs including anemia labs and refer for iron infusion/hemonc if indicated  VB resolved with OCPs, plan to continue    5/25/23: US today nl  Pt did not get anemia labs done in March, will complete today after lab re-opens after lunch Speaking [employer]herent[employer]

## 2023-06-01 NOTE — ED PROVIDER NOTE - CARDIAC, MLM
Normal rate, regular rhythm.  Heart sounds S1, S2.  No murmurs, rubs or gallops. Carac Pregnancy And Lactation Text: This medication is Pregnancy Category X and contraindicated in pregnancy and in women who may become pregnant. It is unknown if this medication is excreted in breast milk.

## 2023-06-24 ENCOUNTER — EMERGENCY (EMERGENCY)
Facility: HOSPITAL | Age: 63
LOS: 1 days | Discharge: LEFT BEFORE DISCHARGE | End: 2023-06-24
Admitting: EMERGENCY MEDICINE
Payer: MEDICAID

## 2023-06-24 VITALS
OXYGEN SATURATION: 96 % | SYSTOLIC BLOOD PRESSURE: 183 MMHG | RESPIRATION RATE: 18 BRPM | WEIGHT: 139.99 LBS | HEIGHT: 64 IN | HEART RATE: 98 BPM | DIASTOLIC BLOOD PRESSURE: 108 MMHG | TEMPERATURE: 99 F

## 2023-06-24 LAB
APPEARANCE UR: ABNORMAL
BACTERIA # UR AUTO: ABNORMAL /HPF
BILIRUB UR-MCNC: NEGATIVE — SIGNIFICANT CHANGE UP
COD CRY URNS QL: SIGNIFICANT CHANGE UP
COLOR SPEC: YELLOW — SIGNIFICANT CHANGE UP
DIFF PNL FLD: ABNORMAL
EPI CELLS # UR: >10 — SIGNIFICANT CHANGE UP
GLUCOSE UR QL: NEGATIVE MG/DL — SIGNIFICANT CHANGE UP
GRAN CASTS # UR COMP ASSIST: 0 — SIGNIFICANT CHANGE UP
HYALINE CASTS # UR AUTO: 0 — SIGNIFICANT CHANGE UP
KETONES UR-MCNC: NEGATIVE MG/DL — SIGNIFICANT CHANGE UP
LEUKOCYTE ESTERASE UR-ACNC: ABNORMAL
NITRITE UR-MCNC: NEGATIVE — SIGNIFICANT CHANGE UP
PH UR: 5.5 — SIGNIFICANT CHANGE UP (ref 5–8)
PROT UR-MCNC: NEGATIVE MG/DL — SIGNIFICANT CHANGE UP
RBC CASTS # UR COMP ASSIST: 2 /HPF — SIGNIFICANT CHANGE UP (ref 0–4)
SP GR SPEC: 1.01 — SIGNIFICANT CHANGE UP (ref 1–1.03)
TRI-PHOS CRY UR QL COMP ASSIST: SIGNIFICANT CHANGE UP
URATE CRY FLD QL MICRO: SIGNIFICANT CHANGE UP
UROBILINOGEN FLD QL: 1 MG/DL — SIGNIFICANT CHANGE UP (ref 0.2–1)
WBC UR QL: SIGNIFICANT CHANGE UP /HPF (ref 0–5)

## 2023-06-24 PROCEDURE — 99284 EMERGENCY DEPT VISIT MOD MDM: CPT

## 2023-06-24 RX ORDER — LIDOCAINE 4 G/100G
1 CREAM TOPICAL ONCE
Refills: 0 | Status: COMPLETED | OUTPATIENT
Start: 2023-06-24 | End: 2023-06-24

## 2023-06-24 RX ADMIN — LIDOCAINE 1 PATCH: 4 CREAM TOPICAL at 18:38

## 2023-06-24 NOTE — ED ADULT NURSE NOTE - NSFALLUNIVINTERV_ED_ALL_ED
Bed/Stretcher in lowest position, wheels locked, appropriate side rails in place/Call bell, personal items and telephone in reach/Instruct patient to call for assistance before getting out of bed/chair/stretcher/Non-slip footwear applied when patient is off stretcher/[place] to call system/Physically safe environment - no spills, clutter or unnecessary equipment/Purposeful proactive rounding/Room/bathroom lighting operational, light cord in reach

## 2023-06-24 NOTE — ED PROVIDER NOTE - OBJECTIVE STATEMENT
61 yo F, presenting to the ED c/o R lower back pain x 2 days. Pt is currently visiting [place] from [facility]; she states while on the plane, she began feeling discomfort. The pain is non radiating and worsened w/ movement. In the past, she has had similar symptoms which improved w/ percocet and a lidocaine patch. Denies bowel/bladder incontinence, weakness, paresthesias, fevers, chills, nausea, vomiting, chest pain, SOB, falls, or injury.

## 2023-06-24 NOTE — ED ADULT NURSE REASSESSMENT NOTE - NS ED NURSE REASSESS COMMENT FT1
Received handoff from Samy RADFORD. Patient in no acute distress-patient comfortable. safety intact- care ongoing.

## 2023-06-24 NOTE — ED ADULT NURSE REASSESSMENT NOTE - REASSESS COMMUNICATION
DISCHARGE PLANNING     Discharge to home or other facility with appropriate resources Progressing        INFECTION - ADULT     Absence or prevention of progression during hospitalization Progressing     Absence of fever/infection during neutropenic period Progressing        Knowledge Deficit     Patient/family/caregiver demonstrates understanding of disease process, treatment plan, medications, and discharge instructions Progressing        PAIN - ADULT     Verbalizes/displays adequate comfort level or baseline comfort level Progressing        SAFETY ADULT     Patient will remain free of falls Progressing     Maintain or return to baseline ADL function Progressing     Maintain or return mobility status to optimal level Progressing primary physician called

## 2023-06-24 NOTE — ED PROVIDER NOTE - PHYSICAL EXAMINATION
VITAL SIGNS: I have reviewed nursing notes and confirm.  CONSTITUTIONAL: Well-developed; well-nourished; in no acute distress.  SKIN: Skin is warm and dry, no acute rash.  HEAD: Normocephalic; atraumatic.  NECK: Supple; non tender.  CARD: S1, S2 normal; no murmurs, gallops, or rubs. Regular rate and rhythm.  RESP: No wheezes, rales or rhonchi.  ABD: Soft; non-distended; non-tender; no rebound or guarding.  EXT: Normal ROM. No clubbing, cyanosis or edema.  SPINE: R lower lumbar paraspinal ttp; no midline ttp and FROM of the spine.  NEURO: Alert, oriented. Grossly unremarkable. GAUTAM, normal tone, no gross motor or sensory changes. Fluent speech.   PSYCH: Cooperative, appropriate. Mood and affect wnl.

## 2023-06-24 NOTE — ED ADULT TRIAGE NOTE - CHIEF COMPLAINT QUOTE
Pt walked in c/o of bilateral lower back pain after slipping and twisting her back x 4 days. Pt denies falling and loss of bowel/bladder control. Pt also complains of L shoulder pain after a yoga class yesterday. Pt arrives hypertensive and reports being nervous.

## 2023-06-24 NOTE — ED PROVIDER NOTE - CLINICAL SUMMARY MEDICAL DECISION MAKING FREE TEXT BOX
63 yo F, presenting to the ED c/o R lower back pain x 2 days. Pt is currently visiting [place] from [facility]; she states while on the plane, she began feeling discomfort. Patient noted to have R paraspinal back pain; no red flags on exam. Will plan to give lidocaine patch and percocet for symptoms relief. Will send UA, reassess and dispo pending clinical improvement.

## 2023-06-28 DIAGNOSIS — Z88.1 ALLERGY STATUS TO OTHER ANTIBIOTIC AGENTS STATUS: ICD-10-CM

## 2023-06-28 DIAGNOSIS — Z88.6 ALLERGY STATUS TO ANALGESIC AGENT: ICD-10-CM

## 2023-06-28 DIAGNOSIS — Z88.0 ALLERGY STATUS TO PENICILLIN: ICD-10-CM

## 2023-06-28 DIAGNOSIS — Z53.29 PROCEDURE AND TREATMENT NOT CARRIED OUT BECAUSE OF PATIENT'S DECISION FOR OTHER REASONS: ICD-10-CM

## 2023-06-28 DIAGNOSIS — Z88.5 ALLERGY STATUS TO NARCOTIC AGENT: ICD-10-CM

## 2023-06-28 DIAGNOSIS — M54.50 LOW BACK PAIN, UNSPECIFIED: ICD-10-CM

## 2023-08-01 NOTE — ED ADULT TRIAGE NOTE - AS TEMP SITE
ISSUE:   Tacrolimus IR level 6.1 on 7/31, goal 8, dose 3 mg BID.    PLAN:   Please call patient and confirm this was an accurate 12-hour trough. Verify Tacrolimus IR dose 3 mg BID. Confirm no new medications or illness. Confirm no missed doses. If accurate trough and accurate dose, increase Tacrolimus IR dose to 3 mg AM, 4 mg PM and repeat labs in 1 weeks    OUTCOME:   Spoke with patient, they confirm accurate trough level and current dose 3 mg BID. Patient confirmed dose change to 3 mg AM, 4 mg PM and to repeat labs in 1 weeks. Orders sent to preferred pharmacy for dose change and lab for repeat labs. Patient voiced understanding of plan.     
oral

## 2023-08-17 NOTE — ED ADULT NURSE NOTE - CHIEF COMPLAINT QUOTE
Pt with known Hx of Back Pain CO lower back pain radiating down RLE x1 month.  Pt states "This was a problem a few years ago but I've been out of work and it went away, I just started working again and the pain came back."  PT denies recent falls, trauma, dizziness, N/V/D, SOB, Fevers and CP
Yes

## 2023-08-27 ENCOUNTER — EMERGENCY (EMERGENCY)
Facility: HOSPITAL | Age: 63
LOS: 1 days | Discharge: LEFT BEFORE DISCHARGE | End: 2023-08-27
Attending: EMERGENCY MEDICINE | Admitting: EMERGENCY MEDICINE
Payer: MEDICAID

## 2023-08-27 VITALS
TEMPERATURE: 99 F | WEIGHT: 139.99 LBS | SYSTOLIC BLOOD PRESSURE: 187 MMHG | DIASTOLIC BLOOD PRESSURE: 102 MMHG | OXYGEN SATURATION: 94 % | HEART RATE: 86 BPM | RESPIRATION RATE: 18 BRPM | HEIGHT: 64 IN

## 2023-08-27 PROCEDURE — 99284 EMERGENCY DEPT VISIT MOD MDM: CPT

## 2023-08-27 RX ORDER — CLONAZEPAM 1 MG
0.5 TABLET ORAL ONCE
Refills: 0 | Status: COMPLETED | OUTPATIENT
Start: 2023-08-27 | End: 2023-08-27

## 2023-08-27 NOTE — ED PROVIDER NOTE - PHYSICAL EXAMINATION
Patient awake and alert, no acute distress.  Vitals within normal.  No respiratory distress.  Neurologically intact, moving all extremities.  Ambulating without difficulty.  Rest of the exam unable to perform, as patient eloped.

## 2023-08-27 NOTE — ED ADULT TRIAGE NOTE - CHIEF COMPLAINT QUOTE
Pt walks in c/o productive cough (yellow phlegm) for 1 week. Pt has tested negative for COVID/Flu/RSV 2x. Pt also c/o R sided flank/hip pain. Denies urinary symptoms but states she was treated for a UTI approx 3 weeks ago. Pt last took acetaminophen at 11am.

## 2023-08-27 NOTE — ED PROVIDER NOTE - CLINICAL SUMMARY MEDICAL DECISION MAKING FREE TEXT BOX
This patient is a 62-year-old female.  Presents to the emergency department with multiple complaints.  Patient states that she has a cough, she had the cough for approximately 2 weeks.  Patient states she tested negative for COVID–19  twice.  Patient also states that she had a history of a UTI, and does not know if she still has a UTI.  But primarily, patient states that she has chronic back pain, she is flying back home to [place], and wants "something really strong" for the flight.  In emergency department patient is awake and alert, no acute distress.  Vital signs within normal.  Patient denies headache, dizziness, fevers, chills.  Patient denies shortness of breath.  Patient denies abdominal or back pain.  Patient denies nausea vomiting or diarrhea.    Differential diagnosis for this patient includes but not limited to: Viral syndrome, pneumonia, UTI, chronic back pain.  Discussed with patient that we will send a urinalysis, send COVID, flu and RSV tests, will get a chest x-ray.    Patient recommending strong pain medication for her flight.  Advised we try to stay away from narcotics.  Patient unpleased, patient left the exam room prior to full exam.

## 2023-08-27 NOTE — ED PROVIDER NOTE - OBJECTIVE STATEMENT
This patient is a 62-year-old female.  Presents to the emergency department with multiple complaints.  Patient states that she has a cough, she had the cough for approximately 2 weeks.  Patient states she tested negative for COVID–19  twice.  Patient also states that she had a history of a UTI, and does not know if she still has a UTI.  But primarily, patient states that she has chronic back pain, she is flying back home to [place], and wants "something really strong" for the flight.  In emergency department patient is awake and alert, no acute distress.  Vital signs within normal.  Patient denies headache, dizziness, fevers, chills.  Patient denies shortness of breath.  Patient denies abdominal or back pain.  Patient denies nausea vomiting or diarrhea.

## 2023-08-27 NOTE — ED ADULT NURSE NOTE - OBJECTIVE STATEMENT
Patient already in room with MD upon RN arrival. Patient requested percocet for her flight, MD stated that it was inappropriate order and said no. MD requested full workup, Patient stated that she did not trust MD and stated that she would like to leave. Patient walked out of ER

## 2023-08-28 NOTE — ED PROVIDER NOTE - CARE PLAN
1 Mucosal Advancement Flap Text: Given the location of the defect, shape of the defect and the proximity to free margins a mucosal advancement flap was deemed most appropriate. Incisions were made with a 15 blade scalpel in the appropriate fashion along the cutaneous vermilion border and the mucosal lip. The remaining actinically damaged mucosal tissue was excised.  The mucosal advancement flap was then elevated to the gingival sulcus with care taken to preserve the neurovascular structures and advanced into the primary defect. Care was taken to ensure that precise realignment of the vermilion border was achieved. Principal Discharge DX:	Acute back pain with sciatica, right

## 2023-08-30 DIAGNOSIS — M54.40 LUMBAGO WITH SCIATICA, UNSPECIFIED SIDE: ICD-10-CM

## 2023-08-30 DIAGNOSIS — Z87.440 PERSONAL HISTORY OF URINARY (TRACT) INFECTIONS: ICD-10-CM

## 2023-08-30 DIAGNOSIS — Z20.822 CONTACT WITH AND (SUSPECTED) EXPOSURE TO COVID-19: ICD-10-CM

## 2023-08-30 DIAGNOSIS — R05.2 SUBACUTE COUGH: ICD-10-CM

## 2023-08-30 DIAGNOSIS — G89.29 OTHER CHRONIC PAIN: ICD-10-CM

## 2023-08-30 DIAGNOSIS — Z53.29 PROCEDURE AND TREATMENT NOT CARRIED OUT BECAUSE OF PATIENT'S DECISION FOR OTHER REASONS: ICD-10-CM

## 2023-08-30 DIAGNOSIS — F41.9 ANXIETY DISORDER, UNSPECIFIED: ICD-10-CM

## 2023-12-03 ENCOUNTER — EMERGENCY (EMERGENCY)
Facility: HOSPITAL | Age: 63
LOS: 1 days | Discharge: LEFT BEFORE DISCHARGE | End: 2023-12-03
Attending: EMERGENCY MEDICINE | Admitting: EMERGENCY MEDICINE
Payer: MEDICAID

## 2023-12-03 VITALS
RESPIRATION RATE: 20 BRPM | SYSTOLIC BLOOD PRESSURE: 172 MMHG | TEMPERATURE: 98 F | HEART RATE: 110 BPM | DIASTOLIC BLOOD PRESSURE: 95 MMHG | OXYGEN SATURATION: 92 %

## 2023-12-03 VITALS
DIASTOLIC BLOOD PRESSURE: 82 MMHG | RESPIRATION RATE: 20 BRPM | TEMPERATURE: 98 F | HEART RATE: 101 BPM | SYSTOLIC BLOOD PRESSURE: 144 MMHG | OXYGEN SATURATION: 94 %

## 2023-12-03 LAB
ALBUMIN SERPL ELPH-MCNC: 2.9 G/DL — LOW (ref 3.4–5)
ALBUMIN SERPL ELPH-MCNC: 2.9 G/DL — LOW (ref 3.4–5)
ALP SERPL-CCNC: 237 U/L — HIGH (ref 40–120)
ALP SERPL-CCNC: 237 U/L — HIGH (ref 40–120)
ALT FLD-CCNC: 153 U/L — HIGH (ref 12–42)
ALT FLD-CCNC: 153 U/L — HIGH (ref 12–42)
ANION GAP SERPL CALC-SCNC: 7 MMOL/L — LOW (ref 9–16)
ANION GAP SERPL CALC-SCNC: 7 MMOL/L — LOW (ref 9–16)
APTT BLD: 32.6 SEC — SIGNIFICANT CHANGE UP (ref 24.5–35.6)
APTT BLD: 32.6 SEC — SIGNIFICANT CHANGE UP (ref 24.5–35.6)
AST SERPL-CCNC: 94 U/L — HIGH (ref 15–37)
AST SERPL-CCNC: 94 U/L — HIGH (ref 15–37)
BASOPHILS # BLD AUTO: 0.09 K/UL — SIGNIFICANT CHANGE UP (ref 0–0.2)
BASOPHILS # BLD AUTO: 0.09 K/UL — SIGNIFICANT CHANGE UP (ref 0–0.2)
BASOPHILS NFR BLD AUTO: 0.7 % — SIGNIFICANT CHANGE UP (ref 0–2)
BASOPHILS NFR BLD AUTO: 0.7 % — SIGNIFICANT CHANGE UP (ref 0–2)
BILIRUB SERPL-MCNC: 0.8 MG/DL — SIGNIFICANT CHANGE UP (ref 0.2–1.2)
BILIRUB SERPL-MCNC: 0.8 MG/DL — SIGNIFICANT CHANGE UP (ref 0.2–1.2)
BUN SERPL-MCNC: 13 MG/DL — SIGNIFICANT CHANGE UP (ref 7–23)
BUN SERPL-MCNC: 13 MG/DL — SIGNIFICANT CHANGE UP (ref 7–23)
CALCIUM SERPL-MCNC: 9.8 MG/DL — SIGNIFICANT CHANGE UP (ref 8.5–10.5)
CALCIUM SERPL-MCNC: 9.8 MG/DL — SIGNIFICANT CHANGE UP (ref 8.5–10.5)
CHLORIDE SERPL-SCNC: 97 MMOL/L — SIGNIFICANT CHANGE UP (ref 96–108)
CHLORIDE SERPL-SCNC: 97 MMOL/L — SIGNIFICANT CHANGE UP (ref 96–108)
CO2 SERPL-SCNC: 32 MMOL/L — HIGH (ref 22–31)
CO2 SERPL-SCNC: 32 MMOL/L — HIGH (ref 22–31)
CREAT SERPL-MCNC: 0.65 MG/DL — SIGNIFICANT CHANGE UP (ref 0.5–1.3)
CREAT SERPL-MCNC: 0.65 MG/DL — SIGNIFICANT CHANGE UP (ref 0.5–1.3)
D DIMER BLD IA.RAPID-MCNC: 378 NG/ML DDU — HIGH
D DIMER BLD IA.RAPID-MCNC: 378 NG/ML DDU — HIGH
EGFR: 98 ML/MIN/1.73M2 — SIGNIFICANT CHANGE UP
EGFR: 98 ML/MIN/1.73M2 — SIGNIFICANT CHANGE UP
EOSINOPHIL # BLD AUTO: 0.3 K/UL — SIGNIFICANT CHANGE UP (ref 0–0.5)
EOSINOPHIL # BLD AUTO: 0.3 K/UL — SIGNIFICANT CHANGE UP (ref 0–0.5)
EOSINOPHIL NFR BLD AUTO: 2.2 % — SIGNIFICANT CHANGE UP (ref 0–6)
EOSINOPHIL NFR BLD AUTO: 2.2 % — SIGNIFICANT CHANGE UP (ref 0–6)
GLUCOSE SERPL-MCNC: 112 MG/DL — HIGH (ref 70–99)
GLUCOSE SERPL-MCNC: 112 MG/DL — HIGH (ref 70–99)
HCT VFR BLD CALC: 38.4 % — SIGNIFICANT CHANGE UP (ref 34.5–45)
HCT VFR BLD CALC: 38.4 % — SIGNIFICANT CHANGE UP (ref 34.5–45)
HGB BLD-MCNC: 12.8 G/DL — SIGNIFICANT CHANGE UP (ref 11.5–15.5)
HGB BLD-MCNC: 12.8 G/DL — SIGNIFICANT CHANGE UP (ref 11.5–15.5)
IMM GRANULOCYTES NFR BLD AUTO: 0.7 % — SIGNIFICANT CHANGE UP (ref 0–0.9)
IMM GRANULOCYTES NFR BLD AUTO: 0.7 % — SIGNIFICANT CHANGE UP (ref 0–0.9)
INR BLD: 1.05 — SIGNIFICANT CHANGE UP (ref 0.85–1.18)
INR BLD: 1.05 — SIGNIFICANT CHANGE UP (ref 0.85–1.18)
LACTATE BLDV-MCNC: 1 MMOL/L — SIGNIFICANT CHANGE UP (ref 0.5–2)
LACTATE BLDV-MCNC: 1 MMOL/L — SIGNIFICANT CHANGE UP (ref 0.5–2)
LYMPHOCYTES # BLD AUTO: 1.89 K/UL — SIGNIFICANT CHANGE UP (ref 1–3.3)
LYMPHOCYTES # BLD AUTO: 1.89 K/UL — SIGNIFICANT CHANGE UP (ref 1–3.3)
LYMPHOCYTES # BLD AUTO: 13.9 % — SIGNIFICANT CHANGE UP (ref 13–44)
LYMPHOCYTES # BLD AUTO: 13.9 % — SIGNIFICANT CHANGE UP (ref 13–44)
MAGNESIUM SERPL-MCNC: 2.4 MG/DL — SIGNIFICANT CHANGE UP (ref 1.6–2.6)
MAGNESIUM SERPL-MCNC: 2.4 MG/DL — SIGNIFICANT CHANGE UP (ref 1.6–2.6)
MCHC RBC-ENTMCNC: 31.8 PG — SIGNIFICANT CHANGE UP (ref 27–34)
MCHC RBC-ENTMCNC: 31.8 PG — SIGNIFICANT CHANGE UP (ref 27–34)
MCHC RBC-ENTMCNC: 33.3 GM/DL — SIGNIFICANT CHANGE UP (ref 32–36)
MCHC RBC-ENTMCNC: 33.3 GM/DL — SIGNIFICANT CHANGE UP (ref 32–36)
MCV RBC AUTO: 95.5 FL — SIGNIFICANT CHANGE UP (ref 80–100)
MCV RBC AUTO: 95.5 FL — SIGNIFICANT CHANGE UP (ref 80–100)
MONOCYTES # BLD AUTO: 1.23 K/UL — HIGH (ref 0–0.9)
MONOCYTES # BLD AUTO: 1.23 K/UL — HIGH (ref 0–0.9)
MONOCYTES NFR BLD AUTO: 9.1 % — SIGNIFICANT CHANGE UP (ref 2–14)
MONOCYTES NFR BLD AUTO: 9.1 % — SIGNIFICANT CHANGE UP (ref 2–14)
NEUTROPHILS # BLD AUTO: 9.96 K/UL — HIGH (ref 1.8–7.4)
NEUTROPHILS # BLD AUTO: 9.96 K/UL — HIGH (ref 1.8–7.4)
NEUTROPHILS NFR BLD AUTO: 73.4 % — SIGNIFICANT CHANGE UP (ref 43–77)
NEUTROPHILS NFR BLD AUTO: 73.4 % — SIGNIFICANT CHANGE UP (ref 43–77)
NRBC # BLD: 0 /100 WBCS — SIGNIFICANT CHANGE UP (ref 0–0)
NRBC # BLD: 0 /100 WBCS — SIGNIFICANT CHANGE UP (ref 0–0)
NT-PROBNP SERPL-SCNC: 250 PG/ML — SIGNIFICANT CHANGE UP
NT-PROBNP SERPL-SCNC: 250 PG/ML — SIGNIFICANT CHANGE UP
PLATELET # BLD AUTO: 436 K/UL — HIGH (ref 150–400)
PLATELET # BLD AUTO: 436 K/UL — HIGH (ref 150–400)
POTASSIUM SERPL-MCNC: 3.9 MMOL/L — SIGNIFICANT CHANGE UP (ref 3.5–5.3)
POTASSIUM SERPL-MCNC: 3.9 MMOL/L — SIGNIFICANT CHANGE UP (ref 3.5–5.3)
POTASSIUM SERPL-SCNC: 3.9 MMOL/L — SIGNIFICANT CHANGE UP (ref 3.5–5.3)
POTASSIUM SERPL-SCNC: 3.9 MMOL/L — SIGNIFICANT CHANGE UP (ref 3.5–5.3)
PROT SERPL-MCNC: 8.2 G/DL — SIGNIFICANT CHANGE UP (ref 6.4–8.2)
PROT SERPL-MCNC: 8.2 G/DL — SIGNIFICANT CHANGE UP (ref 6.4–8.2)
PROTHROM AB SERPL-ACNC: 11.8 SEC — SIGNIFICANT CHANGE UP (ref 9.5–13)
PROTHROM AB SERPL-ACNC: 11.8 SEC — SIGNIFICANT CHANGE UP (ref 9.5–13)
RAPID RVP RESULT: SIGNIFICANT CHANGE UP
RAPID RVP RESULT: SIGNIFICANT CHANGE UP
RBC # BLD: 4.02 M/UL — SIGNIFICANT CHANGE UP (ref 3.8–5.2)
RBC # BLD: 4.02 M/UL — SIGNIFICANT CHANGE UP (ref 3.8–5.2)
RBC # FLD: 13.2 % — SIGNIFICANT CHANGE UP (ref 10.3–14.5)
RBC # FLD: 13.2 % — SIGNIFICANT CHANGE UP (ref 10.3–14.5)
SARS-COV-2 RNA SPEC QL NAA+PROBE: SIGNIFICANT CHANGE UP
SARS-COV-2 RNA SPEC QL NAA+PROBE: SIGNIFICANT CHANGE UP
SODIUM SERPL-SCNC: 136 MMOL/L — SIGNIFICANT CHANGE UP (ref 132–145)
SODIUM SERPL-SCNC: 136 MMOL/L — SIGNIFICANT CHANGE UP (ref 132–145)
TROPONIN I, HIGH SENSITIVITY RESULT: 10.2 NG/L — SIGNIFICANT CHANGE UP
TROPONIN I, HIGH SENSITIVITY RESULT: 10.2 NG/L — SIGNIFICANT CHANGE UP
WBC # BLD: 13.56 K/UL — HIGH (ref 3.8–10.5)
WBC # BLD: 13.56 K/UL — HIGH (ref 3.8–10.5)
WBC # FLD AUTO: 13.56 K/UL — HIGH (ref 3.8–10.5)
WBC # FLD AUTO: 13.56 K/UL — HIGH (ref 3.8–10.5)

## 2023-12-03 PROCEDURE — 99285 EMERGENCY DEPT VISIT HI MDM: CPT

## 2023-12-03 PROCEDURE — 71275 CT ANGIOGRAPHY CHEST: CPT | Mod: 26

## 2023-12-03 RX ORDER — SODIUM CHLORIDE 9 MG/ML
1000 INJECTION INTRAMUSCULAR; INTRAVENOUS; SUBCUTANEOUS ONCE
Refills: 0 | Status: COMPLETED | OUTPATIENT
Start: 2023-12-03 | End: 2023-12-03

## 2023-12-03 RX ORDER — CEFTRIAXONE 500 MG/1
1000 INJECTION, POWDER, FOR SOLUTION INTRAMUSCULAR; INTRAVENOUS ONCE
Refills: 0 | Status: COMPLETED | OUTPATIENT
Start: 2023-12-03 | End: 2023-12-03

## 2023-12-03 RX ORDER — SODIUM CHLORIDE 9 MG/ML
2000 INJECTION INTRAMUSCULAR; INTRAVENOUS; SUBCUTANEOUS ONCE
Refills: 0 | Status: COMPLETED | OUTPATIENT
Start: 2023-12-03 | End: 2023-12-03

## 2023-12-03 RX ORDER — ALBUTEROL 90 UG/1
2 AEROSOL, METERED ORAL
Qty: 1 | Refills: 0
Start: 2023-12-03

## 2023-12-03 RX ORDER — IPRATROPIUM/ALBUTEROL SULFATE 18-103MCG
3 AEROSOL WITH ADAPTER (GRAM) INHALATION
Refills: 0 | Status: COMPLETED | OUTPATIENT
Start: 2023-12-03 | End: 2023-12-03

## 2023-12-03 RX ORDER — ACETAMINOPHEN WITH CODEINE 300MG-30MG
1 TABLET ORAL
Qty: 3 | Refills: 0
Start: 2023-12-03

## 2023-12-03 RX ORDER — AZITHROMYCIN 500 MG/1
500 TABLET, FILM COATED ORAL ONCE
Refills: 0 | Status: COMPLETED | OUTPATIENT
Start: 2023-12-03 | End: 2023-12-03

## 2023-12-03 RX ADMIN — SODIUM CHLORIDE 2000 MILLILITER(S): 9 INJECTION INTRAMUSCULAR; INTRAVENOUS; SUBCUTANEOUS at 11:55

## 2023-12-03 RX ADMIN — Medication 3 MILLILITER(S): at 09:15

## 2023-12-03 RX ADMIN — Medication 3 MILLILITER(S): at 09:50

## 2023-12-03 RX ADMIN — Medication 3 MILLILITER(S): at 09:20

## 2023-12-03 RX ADMIN — Medication 50 MILLIGRAM(S): at 09:32

## 2023-12-03 RX ADMIN — CEFTRIAXONE 100 MILLIGRAM(S): 500 INJECTION, POWDER, FOR SOLUTION INTRAMUSCULAR; INTRAVENOUS at 13:14

## 2023-12-03 RX ADMIN — AZITHROMYCIN 255 MILLIGRAM(S): 500 TABLET, FILM COATED ORAL at 11:59

## 2023-12-03 NOTE — ED PROVIDER NOTE - ATTENDING CONTRIBUTION TO CARE
Patient with history of PTSD, remote history of smoking (quit 10 years ago) presents with cough and shortness of breath for the past several weeks.  Patient took a course of p.o. azithromycin last week which was completed 2 days ago without relief.  On exam patient is afebrile, tachycardic, hypoxic on room air.  Diffuse expiratory wheezing.  Patient given nebs, steroids with improvement in shortness of breath and mild improvement in wheezing.  Patient persistently hypoxic and tachycardic.  D-dimer elevated, CTA chest negative for PE but does show groundglass opacities and possible underlying bronchiectasis.  WBC mildly elevated.  RVP negative.  Patient given empiric IV antibiotics, IV hydration, cultures sent.  Recommended admission to patient who refused.  States she has many people around her including her  who will keep an eye on her and help her at home.  Patient desatting to 82% on room air after ambulating to the bathroom.  Discussed again with patient, who still refuses admission.  Will sign out AMA.  Understands that leaving AMA carries risk of death or permanent disability.  Return precautions discussed.  Will DC with p.o. prednisone, doxycycline, albuterol.

## 2023-12-03 NOTE — ED PROVIDER NOTE - PATIENT PORTAL LINK FT
You can access the FollowMyHealth Patient Portal offered by [facility] by registering at the following website: [email or web address]. By joining [employer]’s FollowMyHealth portal, you will also be able to view your health information using other applications (apps) compatible with our system. You can access the FollowMyHealth Patient Portal offered by [facility] by registering at the following website: [email or web address]. By joining [email or web address]’s FollowMyHealth portal, you will also be able to view your health information using other applications (apps) compatible with our system. You can access the FollowMyHealth Patient Portal offered by [facility] by registering at the following website: [email or web address]. By joining [employer]’s FollowMyHealth portal, you will also be able to view your health information using other applications (apps) compatible with our system.

## 2023-12-03 NOTE — ED ADULT NURSE NOTE - NSFALLUNIVINTERV_ED_ALL_ED
Bed/Stretcher in lowest position, wheels locked, appropriate side rails in place/Call bell, personal items and telephone in reach/Instruct patient to call for assistance before getting out of bed/chair/stretcher/Non-slip footwear applied when patient is off stretcher/[place] to call system/Physically safe environment - no spills, clutter or unnecessary equipment/Purposeful proactive rounding/Room/bathroom lighting operational, light cord in reach Bed/Stretcher in lowest position, wheels locked, appropriate side rails in place/Call bell, personal items and telephone in reach/Instruct patient to call for assistance before getting out of bed/chair/stretcher/Non-slip footwear applied when patient is off stretcher/[place] to call system/Physically safe environment - no spills, clutter or unnecessary equipment/Purposeful proactive rounding/Room/bathroom lighting operational, light cord in reach Bed/Stretcher in lowest position, wheels locked, appropriate side rails in place/Call bell, personal items and telephone in reach/Instruct patient to call for assistance before getting out of bed/chair/stretcher/Non-slip footwear applied when patient is off stretcher/[place] to call system/Physically safe environment - no spills, clutter or unnecessary equipment/Purposeful proactive rounding/Room/bathroom lighting operational, light cord in reach

## 2023-12-03 NOTE — ED ADULT TRIAGE NOTE - NS ED NURSE AMBULANCES
[facility]

## 2023-12-03 NOTE — ED ADULT TRIAGE NOTE - CHIEF COMPLAINT QUOTE
patient BIBA from home c/o wheezing, SOB x16 weeks; was dx with PNA 16 weeks ago and hasn't been the same since; 92% on 4LNC in triage

## 2023-12-03 NOTE — ED PROVIDER NOTE - NSFOLLOWUPINSTRUCTIONS_ED_ALL_ED_FT
I do NOT recommend that you go home with your degree of hypoxia (low oxygen saturation).  Please return to the ER for admission to the hospital for further workup and management.

## 2023-12-03 NOTE — ED ADULT NURSE NOTE - NSICDXPASTSURGICALHX_GEN_ALL_CORE_FT
BP stable   Continue toprol 50 mg daily and amlodipine 2.5mg daily   Low Na diet    PAST SURGICAL HISTORY:  No significant past surgical history

## 2023-12-03 NOTE — ED PROVIDER NOTE - CLINICAL SUMMARY MEDICAL DECISION MAKING FREE TEXT BOX
63-year-old female pmhx of depression (buspar) comes to ED w/ SOB, cough, wheezing. Started 16 weeks ago.  Reports that shortness of breath started 4 months ago in the meantime she has been diagnosed with UTI and pneumonia 2 months prior.  Patient completed course of antibiotics although does not remember the names of the different antibiotics.  Their pain/symptom is moderate, constant, non mediating with rest.denies any chest pain, abdominal pain, nausea, vomiting, diarrhea.    Meds/blood thinner use: BuSpar for depression, denies any blood thinners    Pharmacy  [facility] [id number]    PCP denies    General: NAD   HEENT: NCAT, PERRL   Cardiac: Tachycardic rate RR, no murmurs, 2+ peripheral pulses   Chest: Inspiratory and expiratory wheezing throughout lung fields.  Abdomen: soft, non-distended, bowel sounds present, no ttp, no rebound or guarding   Extremities: no peripheral edema, calf tenderness, or leg size discrepancies   Skin: no rashes   Neuro: AAOx4, 5+motor, sensory grossly intact   Psych: mood and affect appropriate    Impression: 63-year-old female pmhx of depression (buspar) comes to ED w/ SOB, cough, wheezing. Their symptoms and exam findings are concerning for viral illness, pneumonia, COPD, asthma.    Ordered labs, imaging, medications for diagnosis, management, and treatment. 63-year-old female pmhx of depression (buspar) comes to ED w/ SOB, cough, wheezing. Started 16 weeks ago.  Reports that shortness of breath started 4 months ago in the meantime she has been diagnosed with UTI and pneumonia 2 months prior.  Patient completed course of antibiotics although does not remember the names of the different antibiotics.  Their pain/symptom is moderate, constant, non mediating with rest.denies any chest pain, abdominal pain, nausea, vomiting, diarrhea.    Meds/blood thinner use: BuSpar for depression, denies any blood thinners    Pharmacy  [facility] [id number]    PCP denies    General: NAD   HEENT: NCAT, PERRL   Cardiac: Tachycardic rate RR, no murmurs, 2+ peripheral pulses   Chest: Inspiratory and expiratory wheezing throughout lung fields.  Abdomen: soft, non-distended, bowel sounds present, no ttp, no rebound or guarding   Extremities: no peripheral edema, calf tenderness, or leg size discrepancies   Skin: no rashes   Neuro: AAOx4, 5+motor, sensory grossly intact   Psych: mood and affect appropriate    Impression: 63-year-old female pmhx of depression (buspar) comes to ED w/ SOB, cough, wheezing. Their symptoms and exam findings are concerning for viral illness, pneumonia, COPD, asthma.    Ordered labs, imaging, medications for diagnosis, management, and treatment. 63-year-old female pmhx of depression (buspar) comes to ED w/ SOB, cough, wheezing. Started 16 weeks ago.  Reports that shortness of breath started 4 months ago in the meantime she has been diagnosed with UTI and pneumonia 2 months prior.  Patient completed course of antibiotics although does not remember the names of the different antibiotics.  Their pain/symptom is moderate, constant, non mediating with rest.denies any chest pain, abdominal pain, nausea, vomiting, diarrhea.    Meds/blood thinner use: BuSpar for depression, denies any blood thinners    Pharmacy  [facility] [id number]    PCP denies    General: NAD   HEENT: NCAT, PERRL   Cardiac: Tachycardic rate RR, no murmurs, 2+ peripheral pulses   Chest: Inspiratory and expiratory wheezing throughout lung fields.  Abdomen: soft, non-distended, bowel sounds present, no ttp, no rebound or guarding   Extremities: no peripheral edema, calf tenderness, or leg size discrepancies   Skin: no rashes   Neuro: AAOx4, 5+motor, sensory grossly intact   Psych: mood and affect appropriate    Impression: 63-year-old female pmhx of depression (buspar) comes to ED w/ SOB, cough, wheezing. Their symptoms and exam findings are concerning for viral illness, pneumonia, COPD, asthma.    Ordered labs, imaging, medications for diagnosis, management, and treatment.

## 2023-12-05 DIAGNOSIS — F32.A DEPRESSION, UNSPECIFIED: ICD-10-CM

## 2023-12-05 DIAGNOSIS — Z87.440 PERSONAL HISTORY OF URINARY (TRACT) INFECTIONS: ICD-10-CM

## 2023-12-05 DIAGNOSIS — R06.02 SHORTNESS OF BREATH: ICD-10-CM

## 2023-12-05 DIAGNOSIS — R06.2 WHEEZING: ICD-10-CM

## 2023-12-05 DIAGNOSIS — Z20.822 CONTACT WITH AND (SUSPECTED) EXPOSURE TO COVID-19: ICD-10-CM

## 2023-12-05 DIAGNOSIS — Z87.891 PERSONAL HISTORY OF NICOTINE DEPENDENCE: ICD-10-CM

## 2023-12-05 DIAGNOSIS — Z53.29 PROCEDURE AND TREATMENT NOT CARRIED OUT BECAUSE OF PATIENT'S DECISION FOR OTHER REASONS: ICD-10-CM

## 2023-12-05 DIAGNOSIS — Z88.0 ALLERGY STATUS TO PENICILLIN: ICD-10-CM

## 2023-12-05 DIAGNOSIS — Z88.1 ALLERGY STATUS TO OTHER ANTIBIOTIC AGENTS STATUS: ICD-10-CM

## 2023-12-05 DIAGNOSIS — Z88.6 ALLERGY STATUS TO ANALGESIC AGENT: ICD-10-CM

## 2023-12-05 DIAGNOSIS — R09.02 HYPOXEMIA: ICD-10-CM

## 2023-12-05 DIAGNOSIS — R05.3 CHRONIC COUGH: ICD-10-CM

## 2023-12-05 DIAGNOSIS — R00.0 TACHYCARDIA, UNSPECIFIED: ICD-10-CM

## 2023-12-05 DIAGNOSIS — F43.10 POST-TRAUMATIC STRESS DISORDER, UNSPECIFIED: ICD-10-CM

## 2023-12-05 DIAGNOSIS — Z88.5 ALLERGY STATUS TO NARCOTIC AGENT: ICD-10-CM

## 2023-12-07 NOTE — ED PROVIDER NOTE - AGGRAVATING FACTORS
FEVER & PAIN RELIEVER DOSING CHART for CHILDREN       IBUPROFEN (MOTRIN or ADVIL) DOSING (every 6-8 hours)   WEIGHT in   POUNDS  AGE  INFANT DROPS   50 MG/1.25 ml  SUSPENSION   100 mg/5 ml  CHEWABLE   50 mg  CHEWABLE   100 mg     Under 6 m  Do not use under 6 m  Do not use <6m  Do not use  Do not use    12 - 17 lbs  6 - 11 mo  1.25 ml (1 dropper)  2.5 ml      18 - 23 lbs  1 - 2 yr  1.875 ml (1.5 droppers)  3.75 ml      24 - 35 lbs  2 - 3 yr  2.5 ml (2 droppers)  5 ml  2 tabs  1 tab    36 - 47 lbs  4 - 5 yr   7.5 ml  3 tabs  1.5 tabs    48 - 59 lbs  6 - 8 yr   10 ml  4 tabs  2 tabs    60 - 71 lbs  9 - 10 yr   12.5 ml  5 tabs  2.5 tabs    72 - 95 lbs  11+ yr   15 ml  6 tabs  3 tabs    ACETAMINOPHEN (TYLENOL) DOSING (every 4-6 hours)   WEIGHT in   POUNDS  AGE  INFANT DROPS   80 mg/0.8 ml  SUSPENSION   160 mg/5 ml  CHEWABLE   80 mg  CHEWABLE   160 mg    6 -11 lbs  < 4 mo  0.4ml (1/2 dropper)       12 - 17 lbs  4 - 11 mo  0.8ml (1 dropper)  2.5 ml      18 - 23 lbs  1 - 2 yr  1.2ml (1.5 droppers)  3.75 ml      24 - 35 lbs  2 - 3 yr  1.6ml (2 droppers)  5 ml  2 tabs  1 tab    36 - 47 lbs  4 - 5 yr   7.5 ml  3 tabs  1.5 tabs    48 - 59 lbs  6 - 8 yr   10 ml  4 tabs  2 tabs    60 - 71 lbs  9 - 10 yr   12.5 ml  5 tabs  2.5 tabs    72 - 95 lbs  11 - 12 yr   15 ml  6 tabs  3 tabs    > 95 lbs  13 + yr     4 tabs    It is ok to either ALTERNATE acetaminophen and ibuprofen every 4 hours   OR   Give BOTH together every 6 hours   If you give both at the same time, you must wait 6 hours before starting to alternate again    To prevent dehydration,  Continue to encourage your child to take small amount of fluids in frequently.  If the child does not want to eat solids for a day or two, that is okay if taking adequate fluids.  Small sips every 15-30 minutes is a goal to ensure proper urine production.    For congestion,  Use a cool-air humidifier in the room of the affected child, running it around the clock with the door closed to  none ensure maximum air moisture levels.  A timer should be set to ensure the water supply is refilled consistently.      Consider picking up a NoseFrida to assist in removing the mucous from your child's nose.    You may also put a few books under the mattress of the child's crib/bed to raise the head up every so slightly.  This will facilitate drainage.  Nothing should be placed directly into the crib or bed.

## 2023-12-08 LAB
CULTURE RESULTS: SIGNIFICANT CHANGE UP
SPECIMEN SOURCE: SIGNIFICANT CHANGE UP

## 2023-12-28 ENCOUNTER — EMERGENCY (EMERGENCY)
Facility: HOSPITAL | Age: 63
LOS: 1 days | Discharge: HOME | End: 2023-12-28
Attending: STUDENT IN AN ORGANIZED HEALTH CARE EDUCATION/TRAINING PROGRAM | Admitting: STUDENT IN AN ORGANIZED HEALTH CARE EDUCATION/TRAINING PROGRAM
Payer: COMMERCIAL

## 2023-12-28 VITALS
HEIGHT: 64 IN | TEMPERATURE: 99 F | HEART RATE: 100 BPM | DIASTOLIC BLOOD PRESSURE: 107 MMHG | WEIGHT: 145.06 LBS | OXYGEN SATURATION: 95 % | RESPIRATION RATE: 18 BRPM | SYSTOLIC BLOOD PRESSURE: 181 MMHG

## 2023-12-28 VITALS — SYSTOLIC BLOOD PRESSURE: 180 MMHG | DIASTOLIC BLOOD PRESSURE: 98 MMHG

## 2023-12-28 DIAGNOSIS — M54.50 LOW BACK PAIN, UNSPECIFIED: ICD-10-CM

## 2023-12-28 DIAGNOSIS — Z88.0 ALLERGY STATUS TO PENICILLIN: ICD-10-CM

## 2023-12-28 DIAGNOSIS — G89.29 OTHER CHRONIC PAIN: ICD-10-CM

## 2023-12-28 DIAGNOSIS — Z88.6 ALLERGY STATUS TO ANALGESIC AGENT: ICD-10-CM

## 2023-12-28 DIAGNOSIS — Z88.5 ALLERGY STATUS TO NARCOTIC AGENT: ICD-10-CM

## 2023-12-28 DIAGNOSIS — R20.2 PARESTHESIA OF SKIN: ICD-10-CM

## 2023-12-28 DIAGNOSIS — Z88.1 ALLERGY STATUS TO OTHER ANTIBIOTIC AGENTS STATUS: ICD-10-CM

## 2023-12-28 PROCEDURE — 99284 EMERGENCY DEPT VISIT MOD MDM: CPT

## 2023-12-28 RX ORDER — OXYCODONE HYDROCHLORIDE 5 MG/1
1 TABLET ORAL
Qty: 3 | Refills: 0
Start: 2023-12-28 | End: 2023-12-28

## 2023-12-28 RX ORDER — OXYCODONE HYDROCHLORIDE 5 MG/1
5 TABLET ORAL ONCE
Refills: 0 | Status: DISCONTINUED | OUTPATIENT
Start: 2023-12-28 | End: 2023-12-28

## 2023-12-28 RX ORDER — LIDOCAINE 4 G/100G
1 CREAM TOPICAL ONCE
Refills: 0 | Status: COMPLETED | OUTPATIENT
Start: 2023-12-28 | End: 2023-12-28

## 2023-12-28 RX ADMIN — LIDOCAINE 1 PATCH: 4 CREAM TOPICAL at 11:12

## 2023-12-28 RX ADMIN — OXYCODONE HYDROCHLORIDE 5 MILLIGRAM(S): 5 TABLET ORAL at 11:12

## 2023-12-28 NOTE — ED PROVIDER NOTE - PATIENT PORTAL LINK FT
You can access the FollowMyHealth Patient Portal offered by [facility] by registering at the following website: [email or web address]. By joining [employer]’s FollowMyHealth portal, you will also be able to view your health information using other applications (apps) compatible with our system. You can access the FollowMyHealth Patient Portal offered by [facility] by registering at the following website: [email or web address]. By joining [employer]’s FollowMyHealth portal, you will also be able to view your health information using other applications (apps) compatible with our system.

## 2023-12-28 NOTE — ED PROVIDER NOTE - OBJECTIVE STATEMENT
63 year old female with history of chronic back pain/sciatica presenting with acute on chronic low back pain. States she is selling her residence in [place], was moving up and down steps excessively over the w 63 year old female with history of chronic back pain/sciatica presenting with acute on chronic low back pain. States she is selling her residence in [place], was moving up and down steps excessively over the weekend, has worse pain than usual in lower back radiating down both legs with some numbness/tingling in R thigh which she has with flares previously. No incontinence, saddle anesthesia, inability to walk, fevers, chills, night sweats. No recent trauma. Has been taking tylenol at home without significant relief.

## 2023-12-28 NOTE — ED ADULT TRIAGE NOTE - CCCP TRG CHIEF CMPLNT
lower leg pain/injury The resident's documentation has been prepared under my direction and personally reviewed by me in its entirety. I confirm that the note above accurately reflects all work, treatment, procedures, and medical decision making performed by me,  Maxwell Asif MD

## 2023-12-28 NOTE — ED PROVIDER NOTE - PROGRESS NOTE DETAILS
Bala Aguilar MD: Patient reassessed, feels well, wants to go home. Will dc with limited oxy supply--patient stating she has a flight later tonight to [place]. Bala Aguilar MD: Patient reassessed, feels well, wants to go home. Will dc with limited oxy supply--patient stating she has a flight later tonight to [place].

## 2023-12-28 NOTE — ED PROVIDER NOTE - CLINICAL SUMMARY MEDICAL DECISION MAKING FREE TEXT BOX
63 year old female with history of chronic back pain/sciatica presenting with acute on chronic low back pain 63 year old female with history of chronic back pain/sciatica presenting with acute on chronic low back pain--no red flags, hypertensive in setting of pain, neurologically intact on exam, ambulatory. No indication for emergent imaging/labs. Will treat symptomatically. Took tylenol already, allergic to NSAIDS, tried head packs at home. Will give small dose of oxycodone.

## 2023-12-28 NOTE — ED ADULT NURSE REASSESSMENT NOTE - BP NONINVASIVE SYSTOLIC (MM HG)
PT Re-Evaluation  and PT Discharge    Today's date: 2022  Patient name: Luz Caban  : 1951  MRN: [id number]  Referring provider: Walter Bahena DO  Dx:   Encounter Diagnosis     ICD-10-CM    1  Closed transcervical fracture of right femur with routine healing, subsequent encounter  S72 [id number]    2  Status post hip hemiarthroplasty  Z96 [id number]    3  Gait abnormality  R26 9                   Assessment  Assessment details: Luz Caban is a 66 y o  female with a history of anxiety, arthritis, depression, GERD, HTN, hyperlipidemia, BMI>30 that presents for a physical therapy RE evaluation  The patient has attended 17 sessions of skilled physical therapy  The patient demonstrates signs and symptoms consistent with R hip femur Fx s/p R hip hemiarthroplasty; gait abnormality ( patient also has R wrist Fx but is not seeking Rx for this)  During the examination the patient demonstrated improved R LE strength, improved R hip ROM, improved but impaired gait, and decreased R hip pain  The patient has made functional gains since starting therapy  The patient is now able to ambulate without any AD in the community  The patient is able to ambulate up/down the steps normally and perform all house cleaning without difficulty  The patient will be discharged from formal PT to an independent HEP - patient achieved all goals  Symptom irritability: lowBarriers to therapy: R radius/ulna FX  Understanding of Dx/Px/POC: good   Prognosis: good    Goals  STG: Achieve in 4-6 weeks  1  Patient's R hip pain at worst less than 2/10 to allow for proper gait  MET   2  Patient's R hip ROM improve by 10-25 degrees all planes to improve squatting  MET   3  R LE MMT improve to > 4+/5 all motions tested to improve ADL/recreational activities  MET   4  Timed up and go score improve to less than 14 seconds to indicate improved balance/decreased falls risk  MET     LTG:  Achieve in 6-12 weeks  1  Patient's hip FOTO score improve to > 60% to indicate a return to normal functioning  MET 9/14  2  Patient achieve personal goal of walking and functioning normally without a RW  MET 9/14  3  Patient to achieve independence with home exercise plan  MET 9/14      Plan  Plan details: D/C PT TO AN INDEPENDENT HEP - PATIENT ACHIEVED HER GOALS  Treatment plan discussed with: PTA and patient        Subjective Evaluation    History of Present Illness  Date of onset: 7/1/2022  Date of surgery: 7/2/2022  Mechanism of injury: surgery  Mechanism of injury: SUBJECTIVE 9/14/2022: The patient reports improvement since last therapy assessment  The patient is now able to ambulate up/down the steps normally, perform house cleaning, and walk in the community without any AD  The patient denies any difficulty with her function  The patient will be  SUBJECTIVE: 8/17/2022: The patient reports an improvement in  activity tolerance, R LE strength, and LE ROM since starting therapy  The patient is now able to walk with [facility] in the community versus the walker  The patient is now back to driving which she was unable to do previously  The patient is able to tolerate light cleaning at home  The patient continues to have difficulty with lying in bed - she is still sleeping in the recliner  The patient is unable to walk in the community without any AD  The patient is still going 1 step at a time when ambulating up/down the steps  The patient will benefit from continued PT focused on achieving patient specific goals  INJURY HISTORY: Teresa Paris is a 66 y o  female that presents to outpatient physical therapy with complaints of difficultly walking/fuctioning  The patient experienced a displaced transcervical fracture right femur; fracture distal right radius and ulna from a mechanical fall while leashing her dog    The patient received a R hemiarthroplasty at her R hip and closed reduction of the R wrist done by   Digna Driver  The patient reports difficulty with ambulating up/down the steps, difficulty with prolonged walking/standing, unable to lift/carry items, and difficulty with ADL's  The patient does feel unsteady with walking and does have a fear of falling  The patient received acute rehab after her hospital stay and deferred home care therapy to come to outpatient PT  The patient's main goal for physical therapy is to get rid of the walker and walk normally/ function normally  Pain  Current pain ratin  At best pain ratin  At worst pain ratin  Location: R lateral hip  Quality: dull ache  Relieving factors: rest  Aggravating factors: lifting  Progression: improved    Social Support  Steps to enter house: yes  Stairs in house: yes   Lives in: multiple-level home  Lives with: spouse    Employment status: not working  Hand dominance: right    Treatments  Previous treatment: home therapy and physical therapy  Patient Goals  Patient goal: get rid of RW and function normally ( MET)        Objective     Observations     Right Hip  Positive for incision       Additional Observation Details  Patient's incisions are healed    Neurological Testing     Sensation     Hip   Left Hip   Intact: light touch and hot/cold discrimination    Right Hip   Intact: light touch and hot/cold discrimination    Active Range of Motion   Left Hip   Flexion: 75 degrees   Extension: 0 degrees   Abduction: 35 degrees   External rotation (90/90): 30 degrees   Internal rotation (90/90): 22 degrees     Right Hip   Flexion: 107 degrees   Extension: 0 degrees   Abduction: 30 degrees   External rotation (90/90): 20 degrees   Internal rotation (90/90): 35 degrees     Additional Active Range of Motion Details  R hip  Improved ER : 15 deg, IR: 20  deg, ABD: 30 deg, Flex: 45 deg, EXT: 5 deg     Strength/Myotome Testing     Left Hip   Planes of Motion   Flexion: 4+  Extension: 4+  Abduction: 4+  Adduction: 4+  External rotation: 4+  Internal 180 rotation: 4+    Right Hip   Planes of Motion   Flexion: 4  Extension: 4  Abduction: 4  Adduction: 4+  External rotation: 4  Internal rotation: 4+    Additional Strength Details  Much IMPROVED    Tests     Additional Tests Details  FOTO: 99% ( predicted 50%) ( improved )    Gait impairments: Patient ambulates with NO AD WBAT B/L LE  The patient demonstrates normal gait speed, improved trunk posture, equal step lengths, and improved  heel-toe rollover R LE  The patient is less guarded       TUG: 10 seconds no AD ( improved 16 seconds )  30 SSTS: 13 stands ( improved 4 stands)             Precautions: R TOTAL HIP / R WRIST FRACTURE    Re-evaluation:  10/12  [facility]       Hip Specialty Daily Treatment Diary     Manual  9/12 9/14 9/6 9/8   Hip flexion / ER        Hamstring stretch        Prone Quad Stretch                Groin stretch            Therapeutic Exercise 9/12 9/14 8/31 9/6 9/8   Recumbent Bike S=12 L1 x8 min L1 x 8 mins L1 x 5 mins L1 x 6 min L1 x 7 mins   Nu step towel roll S= 10   L4 x5 mins L4 x 5 min     Stand hip ABD  Hip EXT        LAQ                Heel Slide flex/abd        Supine ball squeeze        Clamshell         Bridges        SLR        Ham curls        Calf stretch        Hamstring stretch        Prone on elbow        Standing lumbar extensions        Total Gym Squat 2x10    2x10   ER seated                         Neuro Re-Ed        SLB mirror 2x:30 B/L  1x:30 B/L 1x:30 B/L 1x:30 B/L   Blue foam step over x15 B/L mild UE x15 B/L x10 B/L mild UE use x10 B/L mild UE use x15 B/L mild UE   Mat walk  W/ lump fwd/side 10ft x 4 ea Armando    W/ lump fwd/side 10ft x 4 ea Armando   CSMi balance   L2      Tandem balance        Foam balance EO/EC 1H 1x:30 B/L EO/EC 1H 1x:30 B/L EO/EC 1x:30ea tandem  B/L EO/EC 1x:30ea tandem  B/L EO/EC 1H 1x:30 B/L   Shop walk 25ft x 2 yes, no normal speed  25ft x 2 yes, no 25ft x 2 yes, no 25ft x 2 yes, no normal speed   Tandem walk 10ft x6  10ft x6 10 ft x 6 10ft x 6 Alt step taps 0H x30  8"   0H x8" x25 0H x8" x25 0H x30  8"    Therapeutic Activity        Hurdles         Hurdles ambulation   Over fwd/side 25ft x 2no AD     Sit to stand transfers x10 X13 x20 X 20 x10   Mirror walk 25 ft x 8 fast speed CGA    25 ft x 8 fast speed CGA   Step Ups fwd/side 6" x15 ea  6" x20 ea fwd/lat 6" x20 ea fwd/lat 6" x15 ea   Up/down steps   1HR SOS x 2 trials

## 2023-12-28 NOTE — ED PROVIDER NOTE - BIRTH SEX
----- Message from Alok Reynolds sent at 1/30/2019 10:52 AM CST -----  Contact: [facility] [phone]  [facility] called to get directions for the patient's medication: simvastatin (ZOCOR) 20 MG tablet. Please call at your earliest convenience.   Female

## 2023-12-28 NOTE — ED PROVIDER NOTE - PHYSICAL EXAMINATION
Gen - NAD; well-appearing; A+Ox3   HEENT - NCAT, EOMI, moist mucous membranes  Neck - supple  Resp - CTAB, no increased WOB  CV -  RRR, no m/r/g  Abd - soft, NT, ND; no guarding or rebound  Back - no midline, paraspinous, or CVA tenderness  MSK - FROM of b/l UE and LE, no gross deformities, +positive straight leg raise b/l  Extrem - no LE edema/erythema/tenderness  Neuro - CN2-12 grossly intact, full motor strength and sensation to LT throughout, normal gait  Skin - warm, well perfused

## 2023-12-28 NOTE — ED ADULT TRIAGE NOTE - CHIEF COMPLAINT QUOTE
Pt to ED c/o lower back pain radiating to lower legs starting Monday. "I have been going up and down stairs this week and it has been too much". Denies fall nor edema. Noted to be anxious and hypertensive.

## 2023-12-28 NOTE — ED ADULT NURSE NOTE - OBJECTIVE STATEMENT
63 yr old female c/o right leg pain and numbness. Pt states on Monday she began feeling pain and numbness at the right hip that shoots down to the foot. Pt endorses walking up and down a lot of stairs due to her moving to [place]. No facial droop or loss of sensation or movement in face or upper extremities, pt reports numbness at right leg. Hx of sciatica, pt denies hx of HTN states she has "white coat syndrome". Pt takes no medications. Pt endorses nausea, weakness in affected leg, and headache. Pt denies chest pain, SOB, fevers, chills, vomiting, dizziness, and blurry vision. Respirations spontaneous and unlabored. A&Ox4.

## 2023-12-28 NOTE — ED PROVIDER NOTE - NSFOLLOWUPINSTRUCTIONS_ED_ALL_ED_FT
You were seen in the Emergency Department for: sciatica    You were prescribed to the pharmacy: oxycodone  Please follow the instructions on the container/label and ask your pharmacist for any questions/concerns.    For pain, you may take Tylenol (acetaminophen) 975 mg every 6 hours.    Please follow up with your primary physician. If you do not have a primary physician or specialist of your needs, please call [phone] to find one convenient for you. At this number you will be able to locate a provider who accepts your insurance, as well as locate the right specialist for your needs.    You should return to the Emergency Department if you feel any new/worsening/persistent symptoms including but not limited to: fever, difficulty walking, incontinence, chest pain, difficulty breathing, loss of consciousness, bleeding, uncontrolled pain, numbness/weakness of a body part You were seen in the Emergency Department for: sciatica    You were prescribed to the pharmacy: oxycodone  Please follow the instructions on the container/label and ask your pharmacist for any questions/concerns.    For pain, you may take Tylenol (acetaminophen) 975 mg every 6 hours.    Please follow up with your primary physician. If you do not have a primary physician or specialist of your needs, please call [phone] to find one convenient for you. At this number you will be able to locate a provider who accepts your insurance, as well as locate the right specialist for your needs.    You should return to the Emergency Department if you feel any new/worsening/persistent symptoms including but not limited to: fever, difficulty walking, incontinence, chest pain, difficulty breathing, loss of consciousness, bleeding, uncontrolled pain, numbness/weakness of a body part

## 2023-12-28 NOTE — ED ADULT NURSE NOTE - NSFALLUNIVINTERV_ED_ALL_ED
Bed/Stretcher in lowest position, wheels locked, appropriate side rails in place/Call bell, personal items and telephone in reach/Instruct patient to call for assistance before getting out of bed/chair/stretcher/Non-slip footwear applied when patient is off stretcher/[place] to call system/Physically safe environment - no spills, clutter or unnecessary equipment/Purposeful proactive rounding/Room/bathroom lighting operational, light cord in reach Bed/Stretcher in lowest position, wheels locked, appropriate side rails in place/Call bell, personal items and telephone in reach/Instruct patient to call for assistance before getting out of bed/chair/stretcher/Non-slip footwear applied when patient is off stretcher/[place] to call system/Physically safe environment - no spills, clutter or unnecessary equipment/Purposeful proactive rounding/Room/bathroom lighting operational, light cord in reach

## 2024-04-12 NOTE — ED ADULT NURSE NOTE - HOW OFTEN DO YOU HAVE A DRINK CONTAINING ALCOHOL?
Ph: [phone], Fax: [phone]           [facility]               Reason for admission:                 Altered mental status with shortness of breath.    Brief Summary :     Tushar Block is being seen by nephrology for acute kidney injury.      Interval History and plan:      Blood pressure is elevated .  Urine output is 875 mL.  BUN is 84 > 63   Creatinine is 1.2 > 0.8  Labs are pending from today  CT scan showed bilateral multifocal pneumonia.    Avoid nephrotoxins.  On IVF for worsening hypernatremia   Renal panel daily.  On broad-spectrum antibiotics.  Palliative care note reviewed.                       Assessment :     Acute kidney injury: BUN on presentation is 85 with a creatinine of 1.2.  High BUN is consistent with severe dehydration in the face of infection.    Ultrasound the kidney showed complex cyst.    He received IV fluid boluses.    He is on broad-spectrum antibiotics for possible pneumonia.  Monitor vancomycin level.      Coronary disease status post CABG.  Hypertension: Blood pressure medicine on hold.  Hyperlipidemia.         [facility] would like to thank Kizzy Mae MD   for opportunity to serve this patient      Please call with questions at-   24 Hrs Answering service [phone] or  7 am- 5 pm via Perfect serve or cell phone  Dr.Muhammad Bernardino Kim MD       HPI :     Tushar Block is a 80 y.o. male presented to   the hospital on 4/9/2024 with altered mental status with shortness of breath.    He has past medical history of congestive heart failure, BPH, history of carotid stenosis, chronic back pain, intermittent acute kidney injuries, history of septic arthritis and atrial fibrillation.  He was brought into the hospital with altered mental status and shortness of breath.  As per the history he was hypoxic with oxygen saturation of 70s.  He was obtunded on arrival.    It is worth mentioning he was recently admitted in March 2024 with sepsis.  He was  disintegrating tablet 4 mg, 4 mg, Oral, Q8H PRN **OR** ondansetron (ZOFRAN) injection 4 mg, 4 mg, IntraVENous, Q6H PRN  polyethylene glycol (GLYCOLAX) packet 17 g, 17 g, Oral, Daily PRN  acetaminophen (TYLENOL) tablet 650 mg, 650 mg, Oral, Q6H PRN **OR** acetaminophen (TYLENOL) suppository 650 mg, 650 mg, Rectal, Q6H PRN  piperacillin-tazobactam (ZOSYN) 3,375 mg in sodium chloride 0.9 % 50 mL IVPB (mini-bag), 3,375 mg, IntraVENous, Q8H  budesonide (PULMICORT) nebulizer suspension 250 mcg, 0.25 mg, Nebulization, BID RT **AND** arformoterol tartrate (BROVANA) nebulizer solution 15 mcg, 15 mcg, Nebulization, BID RT    Vitals :     Vitals:    04/12/24 0758   BP: (!) 165/80   Pulse: (!) 107   Resp: 20   Temp: 97.6 °F (36.4 °C)   SpO2: 96%          Physical Examination :     appearance: Obtunded and unresponsive.  Respiratory: On oxygen  Cardiovascular: no visibly  raised JVD, Edema +  Abdomen: -  soft       Labs :     CBC:   Recent Labs     04/09/24  1414 04/10/24  0616 04/11/24  0647   WBC 12.2* 17.3* 16.7*   HGB 9.2* 9.7* 9.1*   HCT 29.8* 31.8* 29.4*    312 259       BMP:    Recent Labs     04/09/24  1903 04/10/24  0016 04/10/24  0616 04/11/24  0647     --  146* 150*   K 5.0  --  5.3* 4.4     --  105 112*   CO2 29  --  25 28   BUN 83*  --  84* 63*   CREATININE 1.2  --  1.1 0.8   GLUCOSE 219*  --  151* 107*   MG  --  2.60* 2.60* 2.60*       Lab Results   Component Value Date/Time    COLORU Yellow 04/10/2024 12:30 PM    NITRU Negative 04/10/2024 12:30 PM    GLUCOSEU Negative 04/10/2024 12:30 PM    KETUA Negative 04/10/2024 12:30 PM    UROBILINOGEN 0.2 04/10/2024 12:30 PM    BILIRUBINUR Negative 04/10/2024 12:30 PM        ----------------------------------------------------------  Please call with questions at      24 Hrs Answering service [phone]  Perfect serve, or cell phone 7 am - 5pm  Wilner Mahan MD   [email or web address]     Never

## 2024-05-29 ENCOUNTER — EMERGENCY (EMERGENCY)
Facility: HOSPITAL | Age: 64
LOS: 1 days | Discharge: HOME | End: 2024-05-29
Admitting: EMERGENCY MEDICINE
Payer: MEDICARE

## 2024-05-29 VITALS
RESPIRATION RATE: 16 BRPM | WEIGHT: 130.07 LBS | OXYGEN SATURATION: 96 % | HEIGHT: 64 IN | DIASTOLIC BLOOD PRESSURE: 72 MMHG | HEART RATE: 86 BPM | SYSTOLIC BLOOD PRESSURE: 153 MMHG | TEMPERATURE: 98 F

## 2024-05-29 VITALS
DIASTOLIC BLOOD PRESSURE: 76 MMHG | SYSTOLIC BLOOD PRESSURE: 137 MMHG | RESPIRATION RATE: 17 BRPM | TEMPERATURE: 98 F | OXYGEN SATURATION: 97 % | HEART RATE: 65 BPM

## 2024-05-29 PROCEDURE — 99284 EMERGENCY DEPT VISIT MOD MDM: CPT

## 2024-05-29 RX ORDER — METOCLOPRAMIDE HCL 10 MG
10 TABLET ORAL ONCE
Refills: 0 | Status: COMPLETED | OUTPATIENT
Start: 2024-05-29 | End: 2024-05-29

## 2024-05-29 RX ORDER — SUMATRIPTAN SUCCINATE 4 MG/.5ML
1 INJECTION, SOLUTION SUBCUTANEOUS
Qty: 3 | Refills: 0
Start: 2024-05-29 | End: 2024-05-31

## 2024-05-29 RX ORDER — SUMATRIPTAN SUCCINATE 4 MG/.5ML
1 INJECTION, SOLUTION SUBCUTANEOUS
Qty: 12 | Refills: 0
Start: 2024-05-29 | End: 2024-05-31

## 2024-05-29 RX ORDER — CLONAZEPAM 1 MG
1 TABLET ORAL ONCE
Refills: 0 | Status: DISCONTINUED | OUTPATIENT
Start: 2024-05-29 | End: 2024-05-29

## 2024-05-29 RX ADMIN — Medication 10 MILLIGRAM(S): at 12:47

## 2024-05-29 RX ADMIN — Medication 1 MILLIGRAM(S): at 12:47

## 2024-05-29 NOTE — ED PROVIDER NOTE - CLINICAL SUMMARY MEDICAL DECISION MAKING FREE TEXT BOX
63-year-old female with PMH of migraines presents to the ED complaining of a headache for the past 4 days.  Physical exam was unremarkable.  In the ED, patient received Reglan and home Klonopin.  Klonopin dose was confirmed with I-STOP. Patient cannot be found for reevaluation, even after waiting some time.  Patient appears to have eloped.

## 2024-05-29 NOTE — ED ADULT TRIAGE NOTE - CHIEF COMPLAINT QUOTE
Pt walked in with C/O headache x4 days. Fell after colliding with linen cart 4 days ago and experiencing headache since. Pt is adamant she didn't hit her head. Hx of migraines.

## 2024-05-29 NOTE — ED PROVIDER NOTE - NS ED ROS FT
Patient denies LOC, head strike, dizziness, blurry vision, chest pain, shortness of breath, alcohol/smoking/drug use.

## 2024-05-29 NOTE — ED PROVIDER NOTE - PHYSICAL EXAMINATION
VITAL SIGNS: I have reviewed nursing notes and confirm.  CONSTITUTIONAL: Well-developed; well-nourished; in no acute distress.  SKIN: Skin is warm and dry, no acute rash.  HEAD: Normocephalic; atraumatic.  EYES: Pupils round bilaterally, 3mm; conjunctiva and sclera clear. No nystagmus.  NECK: Supple; non tender. No neck rigidity and full ROM of the neck.  NEURO: Alert, oriented x3. Grossly unremarkable. GAUTAM, normal tone, no gross motor or sensory changes. Fluent speech. No neuro deficits. Coordination intact with finger to nose and heel to shin test. 5/5 strength in upper and lower extremities.  PSYCH: Cooperative, appropriate. Mood and affect wnl.

## 2024-05-29 NOTE — ED PROVIDER NOTE - NSICDXPASTMEDICALHX_GEN_ALL_CORE_FT
PAST MEDICAL HISTORY:  Anxiety     Disc disorder     Lumbar back pain     Migraines     Sciatica

## 2024-05-29 NOTE — ED PROVIDER NOTE - OBJECTIVE STATEMENT
63-year-old female with PMH of migraines presents to the ED complaining of a headache for the past 4 days.  Patient reports she is originally from [place], [place] and she was visiting her friend who just gave birth. While visiting her friend, a linen cart bumped into her, and she fell forward onto her hands and knees.  Patient adamantly denies any head strike or LOC, but is now endorsing a constant splitting headache at bilateral temples, photosensitivity, and nonbloody vomiting (2 episodes today).  Patient states she typically vomits if she gets a bad migraine. Patient is also endorsing multiple episodes of nonbloody diarrhea.  Patient has taken Tylenol for her pain, but it has not helped. Patient says she left her home Imitrex and Klonopin at home in [place].  Patient is requesting only oral medication and a dose of her home Klonopin.

## 2024-05-31 DIAGNOSIS — Z88.0 ALLERGY STATUS TO PENICILLIN: ICD-10-CM

## 2024-05-31 DIAGNOSIS — Z88.5 ALLERGY STATUS TO NARCOTIC AGENT: ICD-10-CM

## 2024-05-31 DIAGNOSIS — R51.9 HEADACHE, UNSPECIFIED: ICD-10-CM

## 2024-05-31 DIAGNOSIS — G43.909 MIGRAINE, UNSPECIFIED, NOT INTRACTABLE, WITHOUT STATUS MIGRAINOSUS: ICD-10-CM

## 2024-05-31 DIAGNOSIS — Z53.29 PROCEDURE AND TREATMENT NOT CARRIED OUT BECAUSE OF PATIENT'S DECISION FOR OTHER REASONS: ICD-10-CM

## 2024-05-31 DIAGNOSIS — Z88.6 ALLERGY STATUS TO ANALGESIC AGENT: ICD-10-CM

## 2024-08-04 PROBLEM — G43.909 MIGRAINE, UNSPECIFIED, NOT INTRACTABLE, WITHOUT STATUS MIGRAINOSUS: Chronic | Status: ACTIVE | Noted: 2024-05-29

## 2024-12-14 ENCOUNTER — EMERGENCY (EMERGENCY)
Facility: HOSPITAL | Age: 64
LOS: 1 days | Discharge: HOME | End: 2024-12-14
Attending: EMERGENCY MEDICINE | Admitting: EMERGENCY MEDICINE
Payer: MEDICARE

## 2024-12-14 VITALS
DIASTOLIC BLOOD PRESSURE: 88 MMHG | OXYGEN SATURATION: 96 % | HEART RATE: 81 BPM | RESPIRATION RATE: 16 BRPM | SYSTOLIC BLOOD PRESSURE: 170 MMHG | TEMPERATURE: 98 F

## 2024-12-14 VITALS
TEMPERATURE: 98 F | DIASTOLIC BLOOD PRESSURE: 77 MMHG | OXYGEN SATURATION: 97 % | HEART RATE: 80 BPM | SYSTOLIC BLOOD PRESSURE: 180 MMHG | RESPIRATION RATE: 16 BRPM

## 2024-12-14 DIAGNOSIS — R07.89 OTHER CHEST PAIN: ICD-10-CM

## 2024-12-14 DIAGNOSIS — M54.30 SCIATICA, UNSPECIFIED SIDE: ICD-10-CM

## 2024-12-14 LAB
ALBUMIN SERPL ELPH-MCNC: 3.9 G/DL — SIGNIFICANT CHANGE UP (ref 3.4–5)
ALP SERPL-CCNC: 94 U/L — SIGNIFICANT CHANGE UP (ref 40–120)
ALT FLD-CCNC: 31 U/L — SIGNIFICANT CHANGE UP (ref 12–42)
ANION GAP SERPL CALC-SCNC: 8 MMOL/L — LOW (ref 9–16)
AST SERPL-CCNC: 14 U/L — LOW (ref 15–37)
BASOPHILS # BLD AUTO: 0.11 K/UL — SIGNIFICANT CHANGE UP (ref 0–0.2)
BASOPHILS NFR BLD AUTO: 1.3 % — SIGNIFICANT CHANGE UP (ref 0–2)
BILIRUB SERPL-MCNC: 0.3 MG/DL — SIGNIFICANT CHANGE UP (ref 0.2–1.2)
BUN SERPL-MCNC: 13 MG/DL — SIGNIFICANT CHANGE UP (ref 7–23)
CALCIUM SERPL-MCNC: 9.6 MG/DL — SIGNIFICANT CHANGE UP (ref 8.5–10.5)
CHLORIDE SERPL-SCNC: 104 MMOL/L — SIGNIFICANT CHANGE UP (ref 96–108)
CO2 SERPL-SCNC: 28 MMOL/L — SIGNIFICANT CHANGE UP (ref 22–31)
CREAT SERPL-MCNC: 1.04 MG/DL — SIGNIFICANT CHANGE UP (ref 0.5–1.3)
D DIMER BLD IA.RAPID-MCNC: <187 NG/ML DDU — SIGNIFICANT CHANGE UP
EGFR: 59 ML/MIN/1.73M2 — LOW
EOSINOPHIL # BLD AUTO: 0.52 K/UL — HIGH (ref 0–0.5)
EOSINOPHIL NFR BLD AUTO: 6.2 % — HIGH (ref 0–6)
GLUCOSE SERPL-MCNC: 135 MG/DL — HIGH (ref 70–99)
HCT VFR BLD CALC: 40 % — SIGNIFICANT CHANGE UP (ref 34.5–45)
HGB BLD-MCNC: 13.3 G/DL — SIGNIFICANT CHANGE UP (ref 11.5–15.5)
IMM GRANULOCYTES NFR BLD AUTO: 0.1 % — SIGNIFICANT CHANGE UP (ref 0–0.9)
LYMPHOCYTES # BLD AUTO: 2.62 K/UL — SIGNIFICANT CHANGE UP (ref 1–3.3)
LYMPHOCYTES # BLD AUTO: 31.1 % — SIGNIFICANT CHANGE UP (ref 13–44)
MCHC RBC-ENTMCNC: 32 PG — SIGNIFICANT CHANGE UP (ref 27–34)
MCHC RBC-ENTMCNC: 33.3 G/DL — SIGNIFICANT CHANGE UP (ref 32–36)
MCV RBC AUTO: 96.2 FL — SIGNIFICANT CHANGE UP (ref 80–100)
MONOCYTES # BLD AUTO: 0.51 K/UL — SIGNIFICANT CHANGE UP (ref 0–0.9)
MONOCYTES NFR BLD AUTO: 6.1 % — SIGNIFICANT CHANGE UP (ref 2–14)
NEUTROPHILS # BLD AUTO: 4.65 K/UL — SIGNIFICANT CHANGE UP (ref 1.8–7.4)
NEUTROPHILS NFR BLD AUTO: 55.2 % — SIGNIFICANT CHANGE UP (ref 43–77)
NRBC # BLD: 0 /100 WBCS — SIGNIFICANT CHANGE UP (ref 0–0)
PLATELET # BLD AUTO: 333 K/UL — SIGNIFICANT CHANGE UP (ref 150–400)
POTASSIUM SERPL-MCNC: 4.2 MMOL/L — SIGNIFICANT CHANGE UP (ref 3.5–5.3)
POTASSIUM SERPL-SCNC: 4.2 MMOL/L — SIGNIFICANT CHANGE UP (ref 3.5–5.3)
PROT SERPL-MCNC: 7.6 G/DL — SIGNIFICANT CHANGE UP (ref 6.4–8.2)
RBC # BLD: 4.16 M/UL — SIGNIFICANT CHANGE UP (ref 3.8–5.2)
RBC # FLD: 13.4 % — SIGNIFICANT CHANGE UP (ref 10.3–14.5)
SODIUM SERPL-SCNC: 140 MMOL/L — SIGNIFICANT CHANGE UP (ref 132–145)
TROPONIN I, HIGH SENSITIVITY RESULT: 6.7 NG/L — SIGNIFICANT CHANGE UP
WBC # BLD: 8.42 K/UL — SIGNIFICANT CHANGE UP (ref 3.8–10.5)
WBC # FLD AUTO: 8.42 K/UL — SIGNIFICANT CHANGE UP (ref 3.8–10.5)

## 2024-12-14 PROCEDURE — 99285 EMERGENCY DEPT VISIT HI MDM: CPT

## 2024-12-14 PROCEDURE — 71046 X-RAY EXAM CHEST 2 VIEWS: CPT | Mod: 26

## 2024-12-14 RX ORDER — CLONAZEPAM 0.12 MG/1
1 TABLET, ORALLY DISINTEGRATING ORAL ONCE
Refills: 0 | Status: DISCONTINUED | OUTPATIENT
Start: 2024-12-14 | End: 2024-12-14

## 2024-12-14 RX ADMIN — CLONAZEPAM 1 MILLIGRAM(S): 0.12 TABLET, ORALLY DISINTEGRATING ORAL at 13:42
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
## 2024-12-14 NOTE — ED PROVIDER NOTE - NSFOLLOWUPINSTRUCTIONS_ED_ALL_ED_FT
Thank you for letting us take care of you at the [facility] emergency department.    You presented to the emergency department with chest discomfort and shortness of breath.    Your cardiac workup in the emergency department was within normal.    Your symptoms are most consistent with your anxiety.    Please follow-up with your primary care provider.    Please continue taking your medications as prescribed.    Return to the nearest emergency department any further concerns.

## 2024-12-14 NOTE — ED PROVIDER NOTE - NS ED ROS FT
Patient denies headache, no fever, no chills.    Patient complaining of chest tightness but no shortness of breath at this time.    Denies abdominal pain, no nausea, no vomiting.
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
## 2024-12-14 NOTE — ED PROVIDER NOTE - PATIENT PORTAL LINK FT
You can access the FollowMyHealth Patient Portal offered by [facility] by registering at the following website: [email or web address]. By joining [facility]’s FollowMyHealth portal, you will also be able to view your health information using other applications (apps) compatible with our system.
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
## 2024-12-14 NOTE — ED PROVIDER NOTE - PHYSICAL EXAMINATION
Patient awake and alert, no acute distress noted.    Neurologically intact.    Vital signs noted.  Patient slightly hypertensive, but no fever, no hypoxemia.    No respiratory distress noted.    Abdomen soft, nontender.

## 2024-12-16 DIAGNOSIS — F41.9 ANXIETY DISORDER, UNSPECIFIED: ICD-10-CM

## 2024-12-16 DIAGNOSIS — Z88.0 ALLERGY STATUS TO PENICILLIN: ICD-10-CM

## 2024-12-16 DIAGNOSIS — Z88.8 ALLERGY STATUS TO OTHER DRUGS, MEDICAMENTS AND BIOLOGICAL SUBSTANCES: ICD-10-CM

## 2024-12-16 DIAGNOSIS — R07.89 OTHER CHEST PAIN: ICD-10-CM

## 2024-12-16 DIAGNOSIS — Z88.5 ALLERGY STATUS TO NARCOTIC AGENT: ICD-10-CM

## 2025-01-30 ENCOUNTER — EMERGENCY (EMERGENCY)
Age: 65
LOS: 1 days | Discharge: LEFT BEFORE DISCHARGE | End: 2025-01-30
Admitting: EMERGENCY MEDICINE
Payer: MEDICARE

## 2025-01-30 VITALS
HEART RATE: 80 BPM | TEMPERATURE: 98 F | OXYGEN SATURATION: 98 % | SYSTOLIC BLOOD PRESSURE: 180 MMHG | RESPIRATION RATE: 16 BRPM | DIASTOLIC BLOOD PRESSURE: 90 MMHG

## 2025-01-30 DIAGNOSIS — Z53.29 PROCEDURE AND TREATMENT NOT CARRIED OUT BECAUSE OF PATIENT'S DECISION FOR OTHER REASONS: ICD-10-CM

## 2025-01-30 DIAGNOSIS — Z88.8 ALLERGY STATUS TO OTHER DRUGS, MEDICAMENTS AND BIOLOGICAL SUBSTANCES: ICD-10-CM

## 2025-01-30 DIAGNOSIS — Z88.0 ALLERGY STATUS TO PENICILLIN: ICD-10-CM

## 2025-01-30 DIAGNOSIS — K52.9 NONINFECTIVE GASTROENTERITIS AND COLITIS, UNSPECIFIED: ICD-10-CM

## 2025-01-30 DIAGNOSIS — R11.2 NAUSEA WITH VOMITING, UNSPECIFIED: ICD-10-CM

## 2025-01-30 DIAGNOSIS — Z88.5 ALLERGY STATUS TO NARCOTIC AGENT: ICD-10-CM

## 2025-01-30 DIAGNOSIS — Z88.6 ALLERGY STATUS TO ANALGESIC AGENT: ICD-10-CM

## 2025-01-30 LAB
ALBUMIN SERPL ELPH-MCNC: 4 G/DL — SIGNIFICANT CHANGE UP (ref 3.4–5)
ALP SERPL-CCNC: 98 U/L — SIGNIFICANT CHANGE UP (ref 40–120)
ALT FLD-CCNC: 61 U/L — HIGH (ref 12–42)
ANION GAP SERPL CALC-SCNC: 9 MMOL/L — SIGNIFICANT CHANGE UP (ref 9–16)
AST SERPL-CCNC: 32 U/L — SIGNIFICANT CHANGE UP (ref 15–37)
BILIRUB SERPL-MCNC: 0.6 MG/DL — SIGNIFICANT CHANGE UP (ref 0.2–1.2)
BUN SERPL-MCNC: 16 MG/DL — SIGNIFICANT CHANGE UP (ref 7–23)
CALCIUM SERPL-MCNC: 9.2 MG/DL — SIGNIFICANT CHANGE UP (ref 8.5–10.5)
CHLORIDE SERPL-SCNC: 104 MMOL/L — SIGNIFICANT CHANGE UP (ref 96–108)
CO2 SERPL-SCNC: 26 MMOL/L — SIGNIFICANT CHANGE UP (ref 22–31)
CREAT SERPL-MCNC: 0.82 MG/DL — SIGNIFICANT CHANGE UP (ref 0.5–1.3)
EGFR: 79 ML/MIN/1.73M2 — SIGNIFICANT CHANGE UP
EGFR: 79 ML/MIN/1.73M2 — SIGNIFICANT CHANGE UP
GLUCOSE SERPL-MCNC: 95 MG/DL — SIGNIFICANT CHANGE UP (ref 70–99)
HCT VFR BLD CALC: 42 % — SIGNIFICANT CHANGE UP (ref 34.5–45)
HGB BLD-MCNC: 13.8 G/DL — SIGNIFICANT CHANGE UP (ref 11.5–15.5)
MAGNESIUM SERPL-MCNC: 2.2 MG/DL — SIGNIFICANT CHANGE UP (ref 1.6–2.6)
MCHC RBC-ENTMCNC: 30.6 PG — SIGNIFICANT CHANGE UP (ref 27–34)
MCHC RBC-ENTMCNC: 32.9 G/DL — SIGNIFICANT CHANGE UP (ref 32–36)
MCV RBC AUTO: 93.1 FL — SIGNIFICANT CHANGE UP (ref 80–100)
NRBC # BLD: 0 /100 WBCS — SIGNIFICANT CHANGE UP (ref 0–0)
NRBC BLD-RTO: 0 /100 WBCS — SIGNIFICANT CHANGE UP (ref 0–0)
PHOSPHATE SERPL-MCNC: 3.4 MG/DL — SIGNIFICANT CHANGE UP (ref 2.5–4.5)
PLATELET # BLD AUTO: 328 K/UL — SIGNIFICANT CHANGE UP (ref 150–400)
POTASSIUM SERPL-MCNC: 3.9 MMOL/L — SIGNIFICANT CHANGE UP (ref 3.5–5.3)
POTASSIUM SERPL-SCNC: 3.9 MMOL/L — SIGNIFICANT CHANGE UP (ref 3.5–5.3)
PROT SERPL-MCNC: 7.7 G/DL — SIGNIFICANT CHANGE UP (ref 6.4–8.2)
RBC # BLD: 4.51 M/UL — SIGNIFICANT CHANGE UP (ref 3.8–5.2)
RBC # FLD: 14.2 % — SIGNIFICANT CHANGE UP (ref 10.3–14.5)
SODIUM SERPL-SCNC: 139 MMOL/L — SIGNIFICANT CHANGE UP (ref 132–145)
WBC # BLD: 10.79 K/UL — HIGH (ref 3.8–10.5)
WBC # FLD AUTO: 10.79 K/UL — HIGH (ref 3.8–10.5)

## 2025-01-30 PROCEDURE — 99284 EMERGENCY DEPT VISIT MOD MDM: CPT

## 2025-01-30 RX ORDER — ONDANSETRON HCL/PF 4 MG/2 ML
4 VIAL (ML) INJECTION ONCE
Refills: 0 | Status: COMPLETED | OUTPATIENT
Start: 2025-01-30 | End: 2025-01-30

## 2025-01-30 RX ORDER — ONDANSETRON HCL/PF 4 MG/2 ML
4 VIAL (ML) INJECTION ONCE
Refills: 0 | Status: DISCONTINUED | OUTPATIENT
Start: 2025-01-30 | End: 2025-01-30

## 2025-05-14 NOTE — ED PROVIDER NOTE - CROS ED [facility]C ALL NEG
HCC coding opportunities       Chart reviewed, no opportunity found: CHART REVIEWED, NO OPPORTUNITY FOUND        Patients Insurance     Medicare Insurance: [employer]vantage           - - -

## 2025-05-22 NOTE — ED PROVIDER NOTE - NSICDXNOPASTSURGICALHX_GEN_ALL_ED
Patient Quality Outreach    Patient is due for the following:   Diabetes -  A1C and Foot Exam  Asthma  -  ACT needed  Advanced Care Planning     Action(s) Taken:   Patient has upcoming appointment, these items will be addressed at that time.    Type of outreach:    Chart review performed, no outreach needed.    Questions for provider review:    None         Javier Miller MA  Chart routed to None.        
<-- Click to add NO significant Past Surgical History

## 2025-05-23 NOTE — ED ADULT TRIAGE NOTE - CCCP TRG CHIEF CMPLNT
Patient called the Rx Refill Line stating that this refill should not be denied as it is the one going to [facility] so it will take a few days to process and she is just being proactive. Please review to see if the refill is appropriate.     
back pain general

## 2025-08-17 ENCOUNTER — EMERGENCY (EMERGENCY)
Facility: HOSPITAL | Age: 65
LOS: 1 days | End: 2025-08-17
Admitting: STUDENT IN AN ORGANIZED HEALTH CARE EDUCATION/TRAINING PROGRAM
Payer: MEDICARE

## 2025-08-17 VITALS
TEMPERATURE: 97 F | HEART RATE: 109 BPM | OXYGEN SATURATION: 96 % | DIASTOLIC BLOOD PRESSURE: 83 MMHG | WEIGHT: 145.06 LBS | SYSTOLIC BLOOD PRESSURE: 143 MMHG | RESPIRATION RATE: 18 BRPM | HEIGHT: 64 IN

## 2025-08-17 DIAGNOSIS — Z88.0 ALLERGY STATUS TO PENICILLIN: ICD-10-CM

## 2025-08-17 DIAGNOSIS — Z88.5 ALLERGY STATUS TO NARCOTIC AGENT: ICD-10-CM

## 2025-08-17 DIAGNOSIS — Z88.1 ALLERGY STATUS TO OTHER ANTIBIOTIC AGENTS: ICD-10-CM

## 2025-08-17 DIAGNOSIS — Z86.19 PERSONAL HISTORY OF OTHER INFECTIOUS AND PARASITIC DISEASES: ICD-10-CM

## 2025-08-17 DIAGNOSIS — R21 RASH AND OTHER NONSPECIFIC SKIN ERUPTION: ICD-10-CM

## 2025-08-17 DIAGNOSIS — Z88.6 ALLERGY STATUS TO ANALGESIC AGENT: ICD-10-CM

## 2025-08-17 DIAGNOSIS — L25.9 UNSPECIFIED CONTACT DERMATITIS, UNSPECIFIED CAUSE: ICD-10-CM

## 2025-08-17 PROCEDURE — 99283 EMERGENCY DEPT VISIT LOW MDM: CPT

## 2025-08-17 PROCEDURE — 99284 EMERGENCY DEPT VISIT MOD MDM: CPT

## 2025-08-17 RX ORDER — LIDOCAINE HYDROCHLORIDE 20 MG/ML
1 JELLY TOPICAL
Qty: 10 | Refills: 0
Start: 2025-08-17 | End: 2025-08-26

## 2025-08-17 RX ORDER — LIDOCAINE HYDROCHLORIDE 20 MG/ML
1 JELLY TOPICAL
Refills: 0
Start: 2025-08-17

## 2025-08-17 RX ORDER — OXYCODONE HYDROCHLORIDE AND ACETAMINOPHEN 10; 325 MG/1; MG/1
1 TABLET ORAL
Qty: 12 | Refills: 0
Start: 2025-08-17 | End: 2025-08-19

## 2025-08-25 ENCOUNTER — EMERGENCY (EMERGENCY)
Facility: HOSPITAL | Age: 65
LOS: 1 days | End: 2025-08-25
Attending: EMERGENCY MEDICINE | Admitting: EMERGENCY MEDICINE
Payer: MEDICARE

## 2025-08-25 VITALS
SYSTOLIC BLOOD PRESSURE: 177 MMHG | DIASTOLIC BLOOD PRESSURE: 96 MMHG | HEIGHT: 64 IN | TEMPERATURE: 98 F | WEIGHT: 139.99 LBS | RESPIRATION RATE: 16 BRPM | HEART RATE: 90 BPM | OXYGEN SATURATION: 95 %

## 2025-08-25 VITALS
RESPIRATION RATE: 18 BRPM | OXYGEN SATURATION: 98 % | SYSTOLIC BLOOD PRESSURE: 169 MMHG | HEART RATE: 88 BPM | DIASTOLIC BLOOD PRESSURE: 93 MMHG | TEMPERATURE: 98 F

## 2025-08-25 PROCEDURE — 99282 EMERGENCY DEPT VISIT SF MDM: CPT

## 2025-08-25 PROCEDURE — 99284 EMERGENCY DEPT VISIT MOD MDM: CPT

## 2025-08-25 RX ORDER — OXYCODONE HYDROCHLORIDE AND ACETAMINOPHEN 10; 325 MG/1; MG/1
1 TABLET ORAL
Qty: 20 | Refills: 0
Start: 2025-08-25

## 2025-08-25 RX ORDER — TRAMADOL HYDROCHLORIDE AND ACETAMINOPHEN 37.5; 325 MG/1; MG/1
2 TABLET ORAL ONCE
Refills: 0 | Status: DISCONTINUED | OUTPATIENT
Start: 2025-08-25 | End: 2025-08-25

## 2025-08-25 RX ADMIN — TRAMADOL HYDROCHLORIDE AND ACETAMINOPHEN 2 TABLET(S): 37.5; 325 TABLET ORAL at 10:49

## 2025-08-28 DIAGNOSIS — B02.9 ZOSTER WITHOUT COMPLICATIONS: ICD-10-CM

## 2025-08-28 DIAGNOSIS — R20.2 PARESTHESIA OF SKIN: ICD-10-CM

## 2025-08-28 DIAGNOSIS — Z88.6 ALLERGY STATUS TO ANALGESIC AGENT: ICD-10-CM

## 2025-08-28 DIAGNOSIS — Z88.5 ALLERGY STATUS TO NARCOTIC AGENT: ICD-10-CM

## 2025-08-28 DIAGNOSIS — Z88.8 ALLERGY STATUS TO OTHER DRUGS, MEDICAMENTS AND BIOLOGICAL SUBSTANCES: ICD-10-CM

## 2025-09-08 ENCOUNTER — EMERGENCY (EMERGENCY)
Facility: HOSPITAL | Age: 65
LOS: 1 days | End: 2025-09-08
Admitting: EMERGENCY MEDICINE
Payer: MEDICARE

## 2025-09-08 VITALS
HEART RATE: 90 BPM | SYSTOLIC BLOOD PRESSURE: 180 MMHG | DIASTOLIC BLOOD PRESSURE: 80 MMHG | HEIGHT: 64 IN | TEMPERATURE: 98 F | OXYGEN SATURATION: 95 % | RESPIRATION RATE: 18 BRPM | WEIGHT: 139.99 LBS

## 2025-09-08 PROCEDURE — 99283 EMERGENCY DEPT VISIT LOW MDM: CPT

## 2025-09-08 PROCEDURE — 99284 EMERGENCY DEPT VISIT MOD MDM: CPT

## 2025-09-08 RX ORDER — CLOTRIMAZOLE 1 G/100G
1 CREAM TOPICAL
Qty: 1 | Refills: 0
Start: 2025-09-08 | End: 2025-09-21

## 2025-09-08 RX ORDER — OXYCODONE HYDROCHLORIDE AND ACETAMINOPHEN 10; 325 MG/1; MG/1
1 TABLET ORAL
Qty: 8 | Refills: 0
Start: 2025-09-08 | End: 2025-09-09

## 2025-09-08 RX ORDER — TRIAMCINOLONE ACETONIDE 1 MG/G
1 CREAM TOPICAL
Qty: 1 | Refills: 0
Start: 2025-09-08 | End: 2025-09-21

## 2025-09-08 RX ORDER — TRIAMCINOLONE ACETONIDE 1 MG/G
1 CREAM TOPICAL
Refills: 0
Start: 2025-09-08

## 2025-09-10 DIAGNOSIS — Z88.6 ALLERGY STATUS TO ANALGESIC AGENT: ICD-10-CM

## 2025-09-10 DIAGNOSIS — B02.29 OTHER POSTHERPETIC NERVOUS SYSTEM INVOLVEMENT: ICD-10-CM

## 2025-09-10 DIAGNOSIS — Z88.5 ALLERGY STATUS TO NARCOTIC AGENT: ICD-10-CM

## 2025-09-10 DIAGNOSIS — L30.4 ERYTHEMA INTERTRIGO: ICD-10-CM

## 2025-09-10 DIAGNOSIS — Z88.1 ALLERGY STATUS TO OTHER ANTIBIOTIC AGENTS: ICD-10-CM

## 2025-09-10 DIAGNOSIS — Z88.0 ALLERGY STATUS TO PENICILLIN: ICD-10-CM

## 2025-09-10 DIAGNOSIS — L40.9 PSORIASIS, UNSPECIFIED: ICD-10-CM

## 2025-09-10 DIAGNOSIS — R21 RASH AND OTHER NONSPECIFIC SKIN ERUPTION: ICD-10-CM
